# Patient Record
Sex: FEMALE | Race: WHITE | HISPANIC OR LATINO | Employment: UNEMPLOYED | URBAN - METROPOLITAN AREA
[De-identification: names, ages, dates, MRNs, and addresses within clinical notes are randomized per-mention and may not be internally consistent; named-entity substitution may affect disease eponyms.]

---

## 2017-03-21 ENCOUNTER — HOSPITAL ENCOUNTER (EMERGENCY)
Facility: HOSPITAL | Age: 37
Discharge: HOME | End: 2017-03-21
Attending: EMERGENCY MEDICINE
Payer: MEDICAID

## 2017-03-21 VITALS
SYSTOLIC BLOOD PRESSURE: 113 MMHG | WEIGHT: 226 LBS | OXYGEN SATURATION: 99 % | TEMPERATURE: 98 F | RESPIRATION RATE: 12 BRPM | HEIGHT: 62 IN | BODY MASS INDEX: 41.59 KG/M2 | DIASTOLIC BLOOD PRESSURE: 56 MMHG | HEART RATE: 63 BPM

## 2017-03-21 DIAGNOSIS — R52 PAIN: ICD-10-CM

## 2017-03-21 DIAGNOSIS — S63.502A WRIST SPRAIN, LEFT, INITIAL ENCOUNTER: Primary | ICD-10-CM

## 2017-03-21 LAB
B-HCG UR QL: NEGATIVE
CTP QC/QA: YES

## 2017-03-21 PROCEDURE — 25000003 PHARM REV CODE 250: Performed by: EMERGENCY MEDICINE

## 2017-03-21 PROCEDURE — 99284 EMERGENCY DEPT VISIT MOD MDM: CPT | Mod: 25

## 2017-03-21 PROCEDURE — 29125 APPL SHORT ARM SPLINT STATIC: CPT | Mod: LT

## 2017-03-21 RX ORDER — PROPRANOLOL HYDROCHLORIDE 10 MG/1
10 TABLET ORAL 3 TIMES DAILY
COMMUNITY
End: 2018-08-22

## 2017-03-21 RX ORDER — ACETAMINOPHEN 500 MG
1000 TABLET ORAL 3 TIMES DAILY PRN
Qty: 30 TABLET | Refills: 0
Start: 2017-03-21 | End: 2018-08-22

## 2017-03-21 RX ORDER — IBUPROFEN 200 MG
400 TABLET ORAL EVERY 6 HOURS PRN
Qty: 30 TABLET | Refills: 0
Start: 2017-03-21 | End: 2018-08-22

## 2017-03-21 RX ORDER — HYDROCODONE BITARTRATE AND ACETAMINOPHEN 10; 325 MG/1; MG/1
1 TABLET ORAL
Status: COMPLETED | OUTPATIENT
Start: 2017-03-21 | End: 2017-03-21

## 2017-03-21 RX ADMIN — HYDROCODONE BITARTRATE AND ACETAMINOPHEN 1 TABLET: 10; 325 TABLET ORAL at 07:03

## 2017-03-21 NOTE — ED AVS SNAPSHOT
[facility] [address]  [place] [address]               Migdalia Stephens   3/21/2017  7:03 PM   ED    Description:  Female : 1980   Department:  [facility]           Your Care was Coordinated By:     Provider Role From To    Massiel Stephen MD Attending Provider [phone] --      Reason for Visit     Wrist Pain           Diagnoses this Visit        Comments    Wrist sprain, left, initial encounter    -  Primary     Pain           ED Disposition     ED Disposition Condition Comment    Discharge             To Do List           Follow-up Information     Follow up with [facility] In 1 week(s).    Specialty:  Family Medicine    Contact information:    [address] [facility] [address]  [phone]         These Medications        Disp Refills Start End    ibuprofen (ADVIL,MOTRIN) 200 MG tablet 30 tablet 0 3/21/2017     Take 2 tablets (400 mg total) by mouth every 6 (six) hours as needed for Pain. - Oral    Pharmacy: [facility] - [place], [place] - [address] Ph #: [phone]       acetaminophen (TYLENOL) 500 MG tablet 30 tablet 0 3/21/2017     Take 2 tablets (1,000 mg total) by mouth 3 (three) times daily as needed for Pain. - Oral    Pharmacy: [facility] - [place], [place] - [address] Ph #: [phone]         [facility]     [facility] Care [facility] -  Assistance  Registered nurses in the [facility] provide clinical advisement, health education, appointment booking, and other advisory services.  Call for this free service at [phone].             Medications           Message regarding Medications     Verify the changes and/or additions to your medication regime listed below are the same as discussed with your clinician today.  If any of these changes or additions are incorrect, please  "notify your healthcare provider.        START taking these NEW medications        Refills    ibuprofen (ADVIL,MOTRIN) 200 MG tablet 0    Sig: Take 2 tablets (400 mg total) by mouth every 6 (six) hours as needed for Pain.    Class: No Print    Route: Oral    acetaminophen (TYLENOL) 500 MG tablet 0    Sig: Take 2 tablets (1,000 mg total) by mouth 3 (three) times daily as needed for Pain.    Class: No Print    Route: Oral      These medications were administered today        Dose Freq    hydrocodone-acetaminophen 10-325mg per tablet 1 tablet 1 tablet ED 1 Time    Sig: Take 1 tablet by mouth ED 1 Time.    Class: Normal    Route: Oral           Verify that the below list of medications is an accurate representation of the medications you are currently taking.  If none reported, the list may be blank. If incorrect, please contact your healthcare provider. Carry this list with you in case of emergency.           Current Medications     propranolol (INDERAL) 10 MG tablet Take 10 mg by mouth 3 (three) times daily.    acetaminophen (TYLENOL) 500 MG tablet Take 2 tablets (1,000 mg total) by mouth 3 (three) times daily as needed for Pain.    esomeprazole (NEXIUM) 40 MG capsule Take 1 capsule (40 mg total) by mouth once daily.    ibuprofen (ADVIL,MOTRIN) 200 MG tablet Take 2 tablets (400 mg total) by mouth every 6 (six) hours as needed for Pain.    ondansetron (ZOFRAN-ODT) 4 MG TbDL Take 2 tablets (8 mg total) by mouth every 8 (eight) hours as needed.           Clinical Reference Information           Your Vitals Were     BP Pulse Temp Resp Height Weight    113/56 (BP Location: Right arm, Patient Position: Sitting, BP Method: Automatic) 63 98 °F (36.7 °C) (Oral) 12 5' 2" (1.575 m) 102.5 kg (226 lb)    Last Period SpO2 BMI          03/14/2017 99% 41.34 kg/m2        Allergies as of 3/21/2017        Reactions    Imitrex [Sumatriptan] Shortness Of Breath    Chest tightness    Sumatriptan Succinate     Other reaction(s): chest " pain  Other reaction(s): Shortness of breath      Immunizations Administered on Date of Encounter - 3/21/2017     None      ED Micro, Lab, POCT     Start Ordered       Status Ordering Provider    03/21/17 0000 03/21/17 1915  POCT urine pregnancy     Comments:  This order was created through External Result Entry    Completed       ED Imaging Orders     Start Ordered       Status Ordering Provider    03/21/17 1909 03/21/17 1909  X-Ray Wrist Complete Left  1 time imaging      Final result         Discharge Instructions           Wrist Sprain  A sprain is an injury to the ligaments or capsule that holds a joint together. There are no broken bones. Most sprains take about 3 to 6 weeks to heal. If it a severe sprain where the ligament is completely torn, it can take months to recover.    Most wrist sprains are treated with a splint, wrist brace, or elastic wrap for support. Severe sprains may require surgery.  Home care  · Keep your arm elevated to reduce pain and swelling. This is very important during the first 48 hours.  · Apply an ice pack over the injured area for 15 to 20 minutes every 3 to 6 hours. You should do this for the first 24 to 48 hours. You can make an ice pack by filling a plastic bag that seals at the top with ice cubes and then wrapping it with a thin towel. Continue to use ice packs for relief of pain and swelling as needed. As the ice melts, be careful to avoid getting your wrap, splint, or cast wet. After 48 hours, apply heat (warm shower or warm bath) for 15 to 20 minutes several times a day, or alternate ice and heat.   · You may use over-the-counter pain medicine to control pain, unless another pain medicine was prescribed. If you have chronic liver or kidney disease or ever had a stomach ulcer or GI bleeding, talk with your doctor before using these medicines.  · If you were given a splint or brace, wear it for the time advised by your doctor.  Follow-up care  Follow up with your healthcare  provider as advised. Any X-rays you had today dont show any broken bones, breaks, or fractures. Sometimes fractures dont show up on the first X-ray. Bruises and sprains can sometimes hurt as much as a fracture. These injuries can take time to heal completely. If your symptoms dont improve or they get worse, talk with your doctor. You may need a repeat X-ray. If X-rays were taken, you will be told of any new findings that may affect your care.  When to seek medical advice  Call your healthcare provider right away if any of these occur:  · Pain or swelling increases  · Fingers or hand becomes cold, blue, numb, or tingly  Date Last Reviewed: 11/20/2015  © 8364-8124 [employer]. [address] [place] [address]. All rights reserved. This information is not intended as a substitute for professional medical care. Always follow your healthcare professional's instructions.          Wrist Splint: Velcro  A splint is designed to prevent movement of the bones, muscles and tendons. Velcro wrist splints are used because of their comfort and convenience for wrist and hand injuries. In certain conditions, the splint can be removed when bathing or changing clothes. The condition you are being treated for will determine how long you should wear the splint and if it is safe to remove your splint before your next visit. If you are unsure, ask your nurse or doctor.  When to seek medical advice  Call your healthcare provider right away if any of these occur:  · Increased pain or swelling under the splint or in the hand or fingers  · Fingers or hand becomes cold, blue, numb or tingly  Date Last Reviewed: 11/21/2015 © 2000-2016 [employer]. [address] [place] [address]. All rights reserved. This information is not intended as a substitute for professional medical care. Always follow your healthcare professional's instructions.           [facility] complies with  applicable Federal civil rights laws and does not discriminate on the basis of race, color, national origin, age, disability, or sex.        Language Assistance Services     ATTENTION: Language assistance services are available, free of charge. Please call [phone].      ATENCIÓN: Si habla prakash, tiene a palm disposición servicios gratuitos de asistencia lingüística. Llame al [phone].     CHÚ Ý: N?u b?n nói Ti?ng Vi?t, có các d?ch v? h? tr? ngôn ng? mi?n phí dành cho b?n. G?i s? [phone].

## 2017-03-22 NOTE — DISCHARGE INSTRUCTIONS
Wrist Sprain  A sprain is an injury to the ligaments or capsule that holds a joint together. There are no broken bones. Most sprains take about 3 to 6 weeks to heal. If it a severe sprain where the ligament is completely torn, it can take months to recover.    Most wrist sprains are treated with a splint, wrist brace, or elastic wrap for support. Severe sprains may require surgery.  Home care  · Keep your arm elevated to reduce pain and swelling. This is very important during the first 48 hours.  · Apply an ice pack over the injured area for 15 to 20 minutes every 3 to 6 hours. You should do this for the first 24 to 48 hours. You can make an ice pack by filling a plastic bag that seals at the top with ice cubes and then wrapping it with a thin towel. Continue to use ice packs for relief of pain and swelling as needed. As the ice melts, be careful to avoid getting your wrap, splint, or cast wet. After 48 hours, apply heat (warm shower or warm bath) for 15 to 20 minutes several times a day, or alternate ice and heat.   · You may use over-the-counter pain medicine to control pain, unless another pain medicine was prescribed. If you have chronic liver or kidney disease or ever had a stomach ulcer or GI bleeding, talk with your doctor before using these medicines.  · If you were given a splint or brace, wear it for the time advised by your doctor.  Follow-up care  Follow up with your healthcare provider as advised. Any X-rays you had today dont show any broken bones, breaks, or fractures. Sometimes fractures dont show up on the first X-ray. Bruises and sprains can sometimes hurt as much as a fracture. These injuries can take time to heal completely. If your symptoms dont improve or they get worse, talk with your doctor. You may need a repeat X-ray. If X-rays were taken, you will be told of any new findings that may affect your care.  When to seek medical advice  Call your healthcare provider right away if any of  these occur:  · Pain or swelling increases  · Fingers or hand becomes cold, blue, numb, or tingly  Date Last Reviewed: 11/20/2015  © 0544-2818 [employer]. [address] [place] [address]. All rights reserved. This information is not intended as a substitute for professional medical care. Always follow your healthcare professional's instructions.          Wrist Splint: Velcro  A splint is designed to prevent movement of the bones, muscles and tendons. Velcro wrist splints are used because of their comfort and convenience for wrist and hand injuries. In certain conditions, the splint can be removed when bathing or changing clothes. The condition you are being treated for will determine how long you should wear the splint and if it is safe to remove your splint before your next visit. If you are unsure, ask your nurse or doctor.  When to seek medical advice  Call your healthcare provider right away if any of these occur:  · Increased pain or swelling under the splint or in the hand or fingers  · Fingers or hand becomes cold, blue, numb or tingly  Date Last Reviewed: 11/21/2015 © 2000-2016 [employer]. [address] [place] [address]. All rights reserved. This information is not intended as a substitute for professional medical care. Always follow your healthcare professional's instructions.

## 2017-03-22 NOTE — ED PROVIDER NOTES
Encounter Date: 3/21/2017       History     Chief Complaint   Patient presents with    Wrist Pain     left wrist; states fell onto wrist yesterday; FROM; palpable pulse, less than 3 sec refill; no obvious deformity or swelling; took ibuprofen this morning with minimal relief     Review of patient's allergies indicates:   Allergen Reactions    Imitrex [sumatriptan] Shortness Of Breath     Chest tightness    Sumatriptan succinate      Other reaction(s): chest pain  Other reaction(s): Shortness of breath     HPI Comments: 36-year-old female presents with a pain to her left wrist after a fall yesterday.  She is able to move it but has pain with ulnar rotation supination and pronation.  She also has some pain over her just proximal to her thumb.  She has no shoulder or elbow pain.  She has no abrasions.  She has mild amount of swelling over her distal wrist.    Patient is a 36 y.o. female presenting with the following complaint: arm injury. The history is provided by the patient.   Arm Injury    The incident occurred yesterday. The injury mechanism was a fall. No protective equipment was used. She came to the ER via by private vehicle. There is an injury to the left wrist. She is left-handed. Pertinent negatives include no numbness.     Past Medical History:   Diagnosis Date    Acne     Acne     ADD (attention deficit disorder)     Allergy     Anxiety     Depression     GERD (gastroesophageal reflux disease)     Meningitis     at age 15     Past Surgical History:   Procedure Laterality Date    ABCESS DRAINAGE      of parotid gland due to mumps at age 7    CHOLECYSTECTOMY  2005    TONSILLECTOMY      TUBAL LIGATION  2003    TYMPANOSTOMY TUBE PLACEMENT       Family History   Problem Relation Age of Onset    Hyperlipidemia Mother     Diabetes Father     Hypertension Father     Heart disease Father 35    Asthma Daughter     Stroke Maternal Grandmother     Stroke Maternal Grandfather     Stroke Paternal  Grandmother     Stroke Paternal Grandfather     Diabetes Paternal Grandfather     Hypertension Paternal Grandfather     Melanoma Neg Hx      Social History   Substance Use Topics    Smoking status: Never Smoker    Smokeless tobacco: None    Alcohol use No     Review of Systems   Constitutional: Negative.    Eyes: Negative.    Respiratory: Negative.    Genitourinary: Negative.    Neurological: Negative for numbness.   All other systems reviewed and are negative.      Physical Exam   Initial Vitals   BP Pulse Resp Temp SpO2   03/21/17 1816 03/21/17 1816 03/21/17 1816 03/21/17 1816 03/21/17 1816   137/76 72 12 98 °F (36.7 °C) 99 %     Physical Exam    Nursing note and vitals reviewed.  Constitutional: She appears well-developed and well-nourished. She appears distressed.   HENT:   Head: Normocephalic and atraumatic.   Eyes: EOM are normal. Pupils are equal, round, and reactive to light.   Neck: Normal range of motion. Neck supple.   Cardiovascular: Normal rate and normal heart sounds.   Pulmonary/Chest: Breath sounds normal.   Abdominal: Soft.   Musculoskeletal: Normal range of motion.   Ulnar styloid tenderness, as well as mild tenderness over her scaphoid.  She has no bruising but she does have a mild amount of swelling there.  She has very minimal amount of tenderness of her distal radius.  She has 5/5 strength with wrist extension, wrist flexion finger abduction, and hand intrinsics   Neurological: She is alert and oriented to person, place, and time. She has normal strength. No cranial nerve deficit.   Skin: Skin is warm and dry.   Psychiatric: She has a normal mood and affect. Her behavior is normal. Thought content normal.         ED Course   Procedures  Labs Reviewed - No data to display          Medical Decision Making:   Initial Assessment:   36-year-old female status post fall on an outstretched hand 24 hours ago here with persistent pain in her left distal wrist over her ulnar styloid  Differential  Diagnosis:   Differential diagnoses most likely contusion versus nondisplaced ulnar styloid fracture, versus less likely though possible nondisplaced scaphoid fracture.  Clinical Tests:   Radiological Study: Ordered and Reviewed  ED Management:  X-ray without any evidence of a styloid fracture.  Given the patient does have some snuffbox tenderness will give a cockup splint.  Patient was recommended to wear the so long as her wrist was bothering her.  She should follow-up in 2 weeks for repeat x-ray.  Patient was instructed to use ice and ibuprofen.                   ED Course     Clinical Impression:   The primary encounter diagnosis was Wrist sprain, left, initial encounter. A diagnosis of Pain was also pertinent to this visit.          Massiel Stephen MD  03/21/17 2003       Massiel Stephen MD  03/21/17 2004

## 2018-01-15 ENCOUNTER — OFFICE VISIT (OUTPATIENT)
Dept: URGENT CARE | Facility: CLINIC | Age: 38
End: 2018-01-15
Payer: COMMERCIAL

## 2018-01-15 VITALS
SYSTOLIC BLOOD PRESSURE: 115 MMHG | TEMPERATURE: 98 F | DIASTOLIC BLOOD PRESSURE: 65 MMHG | RESPIRATION RATE: 18 BRPM | HEART RATE: 60 BPM | OXYGEN SATURATION: 98 %

## 2018-01-15 DIAGNOSIS — L60.0 INGROWN NAIL OF GREAT TOE OF LEFT FOOT: Primary | ICD-10-CM

## 2018-01-15 PROCEDURE — 11719 TRIM NAIL(S) ANY NUMBER: CPT | Mod: S$GLB,,, | Performed by: FAMILY MEDICINE

## 2018-01-15 PROCEDURE — 99213 OFFICE O/P EST LOW 20 MIN: CPT | Mod: 25,S$GLB,, | Performed by: FAMILY MEDICINE

## 2018-01-15 RX ORDER — CEPHALEXIN 500 MG/1
500 CAPSULE ORAL EVERY 8 HOURS
Qty: 30 CAPSULE | Refills: 0 | Status: SHIPPED | OUTPATIENT
Start: 2018-01-15 | End: 2018-01-25

## 2018-01-15 NOTE — PROGRESS NOTES
Subjective:       Patient ID: Migdalia Stephens is a 37 y.o. female.    Vitals:  tympanic temperature is 97.7 °F (36.5 °C). Her blood pressure is 115/65 and her pulse is 60. Her respiration is 18 and oxygen saturation is 98%.     Chief Complaint: Ingrown Toenail (left big toe)    Ingrown Toenail   This is a new problem. The current episode started in the past 7 days. The problem occurs constantly. The problem has been unchanged. Associated symptoms include joint swelling. Pertinent negatives include no abdominal pain, chest pain, chills, fever, headaches, nausea, rash, sore throat or vomiting. The symptoms are aggravated by standing and walking. She has tried nothing for the symptoms.     Review of Systems   Constitution: Negative for chills and fever.   HENT: Negative for sore throat.    Eyes: Negative for blurred vision.   Cardiovascular: Negative for chest pain.   Respiratory: Negative for shortness of breath.    Skin: Negative for rash.   Musculoskeletal: Positive for joint pain and joint swelling. Negative for back pain.   Gastrointestinal: Negative for abdominal pain, diarrhea, nausea and vomiting.   Neurological: Negative for headaches.   Psychiatric/Behavioral: The patient is not nervous/anxious.        Objective:      Physical Exam   Constitutional: She is oriented to person, place, and time. She appears well-developed and well-nourished. She is cooperative.  Non-toxic appearance. She does not appear ill. No distress.   HENT:   Head: Normocephalic and atraumatic.   Right Ear: Hearing, tympanic membrane, external ear and ear canal normal.   Left Ear: Hearing, tympanic membrane, external ear and ear canal normal.   Nose: Nose normal. No mucosal edema, rhinorrhea or nasal deformity. No epistaxis. Right sinus exhibits no maxillary sinus tenderness and no frontal sinus tenderness. Left sinus exhibits no maxillary sinus tenderness and no frontal sinus tenderness.   Mouth/Throat: Uvula is midline, oropharynx is  clear and moist and mucous membranes are normal. No trismus in the jaw. Normal dentition. No uvula swelling. No posterior oropharyngeal erythema.   Eyes: Conjunctivae and lids are normal. Right eye exhibits no discharge. Left eye exhibits no discharge. No scleral icterus.   Sclera clear bilat   Neck: Trachea normal, normal range of motion, full passive range of motion without pain and phonation normal. Neck supple.   Cardiovascular: Normal rate, regular rhythm, normal heart sounds, intact distal pulses and normal pulses.    Pulmonary/Chest: Effort normal and breath sounds normal. No respiratory distress.   Abdominal: Soft. Normal appearance and bowel sounds are normal. She exhibits no distension, no pulsatile midline mass and no mass. There is no tenderness.   Musculoskeletal: Normal range of motion. She exhibits no edema or tenderness.   Neurological: She is alert and oriented to person, place, and time. She exhibits normal muscle tone. Coordination normal.   Skin: Skin is warm, dry and intact. She is not diaphoretic. No pallor.   Psychiatric: She has a normal mood and affect. Her speech is normal and behavior is normal. Judgment and thought content normal. Cognition and memory are normal.   Nursing note and vitals reviewed.      Assessment:       1. Ingrown nail of great toe of left foot        Plan:         Ingrown nail of great toe of left foot  -     Foot Care    Other orders  -     cephALEXin (KEFLEX) 500 MG capsule; Take 1 capsule (500 mg total) by mouth every 8 (eight) hours.  Dispense: 30 capsule; Refill: 0

## 2018-01-15 NOTE — PROCEDURES
Foot Care  Date/Time: 1/15/2018 10:30 AM  Performed by: BAN CARCAMO  Authorized by: BAN CARCAMO     Hyperkeratotic Skin Lesions?: No      Nail Care Type:  Trim  Patient tolerance:  Patient tolerated the procedure well with no immediate complications     After procedure, dresssing placed no complication

## 2018-08-22 ENCOUNTER — OFFICE VISIT (OUTPATIENT)
Dept: PSYCHIATRY | Facility: CLINIC | Age: 38
End: 2018-08-22
Payer: COMMERCIAL

## 2018-08-22 VITALS
WEIGHT: 161.25 LBS | DIASTOLIC BLOOD PRESSURE: 70 MMHG | BODY MASS INDEX: 29.67 KG/M2 | HEIGHT: 62 IN | HEART RATE: 82 BPM | SYSTOLIC BLOOD PRESSURE: 112 MMHG

## 2018-08-22 DIAGNOSIS — F98.8 ATTENTION DEFICIT DISORDER OF ADULT: Primary | ICD-10-CM

## 2018-08-22 PROCEDURE — 3008F BODY MASS INDEX DOCD: CPT | Mod: CPTII,S$GLB,, | Performed by: PSYCHIATRY & NEUROLOGY

## 2018-08-22 PROCEDURE — 99999 PR PBB SHADOW E&M-EST. PATIENT-LVL III: CPT | Mod: PBBFAC,,, | Performed by: PSYCHIATRY & NEUROLOGY

## 2018-08-22 PROCEDURE — 99214 OFFICE O/P EST MOD 30 MIN: CPT | Mod: S$GLB,,, | Performed by: PSYCHIATRY & NEUROLOGY

## 2018-08-22 RX ORDER — LISDEXAMFETAMINE DIMESYLATE 40 MG/1
40 CAPSULE ORAL DAILY
Qty: 60 CAPSULE | Refills: 0 | Status: SHIPPED | OUTPATIENT
Start: 2018-10-21 | End: 2023-09-18

## 2018-08-22 RX ORDER — LISDEXAMFETAMINE DIMESYLATE 30 MG/1
30 CAPSULE ORAL EVERY MORNING
Qty: 30 CAPSULE | Refills: 0 | Status: SHIPPED | OUTPATIENT
Start: 2018-08-22 | End: 2023-09-18

## 2018-08-22 RX ORDER — LISDEXAMFETAMINE DIMESYLATE 40 MG/1
40 CAPSULE ORAL DAILY
Qty: 30 CAPSULE | Refills: 0 | Status: SHIPPED | OUTPATIENT
Start: 2018-09-21 | End: 2023-09-18

## 2018-08-22 NOTE — PATIENT INSTRUCTIONS
"        You have been provided with a certain amount of medication with a specified number of refills.  Please follow up within an adequate time before you run out of medications.    REFILLS FOR CONTROLLED SUBSTANCES WILL NOT BE GIVEN WITHOUT AN APPOINTMENT.  I will not honor or fill automated refill requests from pharmacies.  You must come in for an appointment to get refills.        Please book your next appointment for myself or therapist by phone by calling our office at [phone].          PLEASE BE AT LEAST 15 MINUTES EARLY FOR YOUR NEXT APPOINTMENT.  PLEASE, DO NOT BE LATE OR YOU WILL BE TURNED AWAY AND ASKED TO RESCHEDULE.  YOU MUST COME EARLY TO ALLOW TIME FOR CHECK-IN AS WELL AS GET YOUR VITAL SIGNS AND GO OVER YOUR MEDICATIONS.  [employer] is not fair to the patients who present after you and are on time for their appointments.  It causes a delay in the appointments for patients and staff.  IF YOU ARE LATE, THERE IS A POSSIBILITY THAT YOU WILL BE CHARGED FOR THE APPOINTMENT TIME PERSONALLY AND IT WILL NOT GO TO YOUR INSURANCE.  YOU MAY ALSO BE DISCHARGED FROM CLINIC with multiple "No Show" appointments.       -----------------------------------------------------------------------------------------------------------------  IF YOU FEEL SUICIDAL OR HAVING THOUGHTS OR PLANS TO HURT YOURSELF OR OTHERS, CALL 911 OR REPORT TO THE NEAREST EMERGENCY ROOM.  YOU CAN ALSO ACCESS THE FOLLOWING HOTLINE:    National Suicide Hotline Number 9-226-777-TALK (6540)                 "

## 2018-08-22 NOTE — PROGRESS NOTES
Outpatient Psychiatry Follow-Up Visit (MD/NP)    8/22/2018    Clinical Status of Patient:  Outpatient (Ambulatory)    Chief Complaint:  Migdalia Crouch is a 37 y.o. female who presents today for follow-up of anxiety and attention problems.  Met with patient.      Interval History and Content of Current Session:  Interim Events/Subjective Report/Content of Current Session: Patient Migdalia Crouch presents to clinic after a long hiatus.  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  She is about to take a 4 month course in order to be promoted.  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  She used to see me in the past for trouble with focus and concentration and did well with Vyvanse.  She is looking to get back on this medication during the course of her studies.  Since last seen her she has done quite well in life and has gone down from 241 to 161 lb with diet and exercise.  She continues to diet and exercise.  No depression or anxiety.  She is having significant problems with focus and concentration.  She is not able to complete tasks or sit down long enough to study for tests.    Psychotherapy:  · Target symptoms: distractability, lack of focus, anxiety   · Why chosen therapy is appropriate versus another modality: patient responds to this modality  · Outcome monitoring methods: self-report, observation  · Therapeutic intervention type: supportive psychotherapy  · Topics discussed/themes: building skills sets for symptom management, symptom recognition  · The patient's response to the intervention is accepting. The patient's progress toward treatment goals is fair.   · Duration of intervention: 15 minutes    Review of Systems   · PSYCHIATRIC: Pertinant items are noted in the narrative.  · CONSTITUTIONAL: No weight gain or loss.   · MUSCULOSKELETAL: No pain or stiffness of the joints.  · NEUROLOGIC: No weakness, sensory changes, seizures,  "confusion, memory loss, tremor or other abnormal movements.  · RESPIRATORY: No shortness of breath.  · CARDIOVASCULAR: No tachycardia or chest pain.  · GASTROINTESTINAL: No nausea, vomiting, pain, constipation or diarrhea.    Past Medical, Family and Social History: The patient's past medical, family and social history have been reviewed and updated as appropriate within the electronic medical record - see encounter notes.    Compliance: yes    Side effects: None    Risk Parameters:  Patient reports no suicidal ideation  Patient reports no homicidal ideation  Patient reports no self-injurious behavior  Patient reports no violent behavior    Exam (detailed: at least 9 elements; comprehensive: all 15 elements)   Constitutional  Vitals:  Most recent vital signs, dated less than 90 days prior to this appointment, were reviewed.   Vitals:    08/22/18 0744   BP: 112/70   Pulse: 82   Weight: 73.1 kg (161 lb 4.3 oz)   Height: 5' 2" (1.575 m)        General:  unremarkable, age appropriate     Musculoskeletal  Muscle Strength/Tone:  no tremor, no tic   Gait & Station:  non-ataxic     Psychiatric  Speech:  no latency; no press   Mood & Affect:  euthymic  congruent and appropriate   Thought Process:  normal and logical   Associations:  intact   Thought Content:  normal, no suicidality, no homicidality, delusions, or paranoia   Insight:  has awareness of illness   Judgement: behavior is adequate to circumstances   Orientation:  person, place, situation, time/date   Memory: intact for content of interview   Language: grossly intact   Attention Span & Concentration:  distracted   Fund of Knowledge:  intact and appropriate to age and level of education     Assessment and Diagnosis   Status/Progress: Based on the examination today, the patient's problem(s) is/are adequately but not ideally controlled.  New problems have been presented today.   Co-morbidities are not complicating management of the primary condition.  There are no " active rule-out diagnoses for this patient at this time.     General Impression: We will continue pharmacological intervention and adjunctive therapy.       ICD-10-CM ICD-9-CM   1. Attention deficit disorder of adult F98.8 314.00       Intervention/Counseling/Treatment Plan   · Medication Management: Continue current medications. The risks and benefits of medication were discussed with the patient.  · Counseling provided with patient as follows: importance of compliance with chosen treatment options was emphasized, risks and benefits of treatment options, including medications, were discussed with the patient, risk factor reduction, prognosis, patient education, instructions for  management, treatment and follow-up were reviewed  1.  Restart Vyvanse 30mg PO daily for 1 month then increase to 40 mg daily thereafter targeting focus and attention.  Warned of risk of addictive potential, HTN, anxiety, insomnia.    Return to Clinic: 3 months, as needed

## 2018-10-24 ENCOUNTER — PATIENT MESSAGE (OUTPATIENT)
Dept: PSYCHIATRY | Facility: CLINIC | Age: 38
End: 2018-10-24

## 2019-10-04 ENCOUNTER — HOSPITAL ENCOUNTER (EMERGENCY)
Facility: HOSPITAL | Age: 39
Discharge: HOME | End: 2019-10-04
Attending: EMERGENCY MEDICINE

## 2019-10-04 VITALS
OXYGEN SATURATION: 97 % | BODY MASS INDEX: 30.73 KG/M2 | WEIGHT: 167 LBS | DIASTOLIC BLOOD PRESSURE: 66 MMHG | HEART RATE: 62 BPM | RESPIRATION RATE: 17 BRPM | SYSTOLIC BLOOD PRESSURE: 109 MMHG | HEIGHT: 62 IN | TEMPERATURE: 98 F

## 2019-10-04 DIAGNOSIS — R10.13 EPIGASTRIC ABDOMINAL PAIN: Primary | ICD-10-CM

## 2019-10-04 LAB
B-HCG UR QL: NEGATIVE
CTP QC/QA: YES

## 2019-10-04 PROCEDURE — 81025 URINE PREGNANCY TEST: CPT | Performed by: EMERGENCY MEDICINE

## 2019-10-04 PROCEDURE — 99283 EMERGENCY DEPT VISIT LOW MDM: CPT

## 2019-10-04 PROCEDURE — 25000003 PHARM REV CODE 250: Performed by: EMERGENCY MEDICINE

## 2019-10-04 RX ORDER — ONDANSETRON 4 MG/1
4 TABLET, ORALLY DISINTEGRATING ORAL
Status: COMPLETED | OUTPATIENT
Start: 2019-10-04 | End: 2019-10-04

## 2019-10-04 RX ADMIN — ONDANSETRON 4 MG: 4 TABLET, ORALLY DISINTEGRATING ORAL at 02:10

## 2019-10-04 RX ADMIN — LIDOCAINE HYDROCHLORIDE: 20 SOLUTION ORAL; TOPICAL at 02:10

## 2019-10-04 NOTE — ED PROVIDER NOTES
"Encounter Date: 10/4/2019    SCRIBE #1 NOTE: I, Michelle Ahumada, am scribing for, and in the presence of,  Dr. Chatterjee. I have scribed the entire note.       History     Chief Complaint   Patient presents with    Abdominal Pain     Pt reports she is having intermittent pain to abdomen right above her navel, started about 1 hour ago. Pt reports that the last time she came here for this, they gave her some "creamy white stuff" to drinka nd the pain went away. +Nausea when the pain hits but denies vomiting.      Time seen by provider: 2:20 PM    This is a 38 y.o. female with a history of GERD who presents to the ED with complaint of intermittent mid epigastric abdominal pain that started at noon today. Patient says she ate lunch and took an "old Tylenol something that was prescribed" for body aches. She says she felt like she didn't eat enough lunch because then she started to have a burning sensation in her abdomen. She notes the pain only lasts a few minutes. Patient describes the pain to be similar to the pain she had before she had a cholecystectomy. Patient reports associated nausea, weakness, and diaphoresis whenever the pain flares up. She denies fever, vomiting, diarrhea, back pain, or any other complaints at this time. Patient says she came to the ED before for similar symptoms. She was given a "creamy white drink" that she reports helped relieve her pain.    The history is provided by the patient.     Review of patient's allergies indicates:   Allergen Reactions    Imitrex [sumatriptan] Shortness Of Breath     Chest tightness    Sumatriptan succinate      Other reaction(s): chest pain  Other reaction(s): Shortness of breath     Past Medical History:   Diagnosis Date    Acne     Acne     ADD (attention deficit disorder)     Allergy     Anxiety     Depression     GERD (gastroesophageal reflux disease)     Meningitis     at age 15     Past Surgical History:   Procedure Laterality Date    ABCESS " DRAINAGE      of parotid gland due to mumps at age 7    CHOLECYSTECTOMY  2005    TONSILLECTOMY      TUBAL LIGATION  2003    TYMPANOSTOMY TUBE PLACEMENT       Family History   Problem Relation Age of Onset    Hyperlipidemia Mother     Diabetes Father     Hypertension Father     Heart disease Father 35    Asthma Daughter     Stroke Maternal Grandmother     Stroke Maternal Grandfather     Stroke Paternal Grandmother     Stroke Paternal Grandfather     Diabetes Paternal Grandfather     Hypertension Paternal Grandfather     Melanoma Neg Hx      Social History     Tobacco Use    Smoking status: Never Smoker    Smokeless tobacco: Never Used   Substance Use Topics    Alcohol use: No    Drug use: No     Review of Systems   Constitutional: Positive for diaphoresis.   Gastrointestinal: Positive for abdominal pain and nausea.   Neurological: Positive for weakness.   All other systems reviewed and are negative.      Physical Exam     Initial Vitals [10/04/19 1309]   BP Pulse Resp Temp SpO2   122/64 65 20 98 °F (36.7 °C) 99 %      MAP       --         Physical Exam    Nursing note and vitals reviewed.  Constitutional: She appears well-developed and well-nourished. No distress.   HENT:   Head: Normocephalic and atraumatic.   Mouth/Throat: Oropharynx is clear and moist.   Eyes: Conjunctivae and EOM are normal. Pupils are equal, round, and reactive to light.   Neck: Normal range of motion. Neck supple. No stridor present. No tracheal deviation present.   Cardiovascular: Normal rate, regular rhythm and normal heart sounds.   No murmur heard.  Pulmonary/Chest: Breath sounds normal. No respiratory distress. She has no wheezes.   Abdominal: Soft. Bowel sounds are normal. There is tenderness. There is no rebound and no guarding.   Mid epigastric tenderness   Musculoskeletal: Normal range of motion. She exhibits no edema or tenderness.   Neurological: She is alert and oriented to person, place, and time. No sensory  deficit.   Skin: Skin is warm and dry. Capillary refill takes less than 2 seconds.   Psychiatric: She has a normal mood and affect. Her behavior is normal.         ED Course   Procedures  Labs Reviewed   POCT URINE PREGNANCY             Medical Decision Making:   Clinical Tests:   Lab Tests: Ordered and Reviewed  ED Management:  38-year-old female who says she took a pain medicine on an empty stomach and now has epigastric burning rising up into her chest.  She was given a Zofran and then a GI cocktail which totally relieved her symptoms. This leads me to believe that she most likely has gastritis or peptic ulcer.  She will follow up with the primary physician when able for recheck and has been advised to take Maalox for epigastric discomfort.  Patient may also return here for any worsening of her condition.                      Clinical Impression:     1. Epigastric abdominal pain        Disposition:   Disposition: Discharged  Condition: Stable       I, Dr. Hosea Chatterjee, personally performed the services described in this documentation. All medical record entries made by the scribe were at my direction and in my presence. I have reviewed the chart and agree that the record reflects my personal performance and is accurate and complete. Hosea Chatterjee MD.  3:49 PM 10/04/2019                   Hosea Chatterjee MD  10/04/19 0957

## 2019-10-04 NOTE — ED TRIAGE NOTES
39 Y/O F with CC of abdominal pain. Pt reports intermittent burning pain to epigastric region with nausea. Reports hx of Cholecystectomy. States last time she had these symptoms she was given a GI cocktail which had resolved the symptoms.

## 2020-02-23 ENCOUNTER — OFFICE VISIT (OUTPATIENT)
Dept: URGENT CARE | Facility: CLINIC | Age: 40
End: 2020-02-23

## 2020-02-23 VITALS
HEIGHT: 62 IN | OXYGEN SATURATION: 100 % | DIASTOLIC BLOOD PRESSURE: 75 MMHG | BODY MASS INDEX: 30.73 KG/M2 | WEIGHT: 167 LBS | SYSTOLIC BLOOD PRESSURE: 121 MMHG | RESPIRATION RATE: 14 BRPM | TEMPERATURE: 99 F | HEART RATE: 76 BPM

## 2020-02-23 DIAGNOSIS — M54.50 ACUTE BILATERAL LOW BACK PAIN WITHOUT SCIATICA: Primary | ICD-10-CM

## 2020-02-23 PROCEDURE — 99203 PR OFFICE/OUTPT VISIT, NEW, LEVL III, 30-44 MIN: ICD-10-PCS | Mod: TIER,25,S$GLB, | Performed by: NURSE PRACTITIONER

## 2020-02-23 PROCEDURE — 96372 PR INJECTION,THERAP/PROPH/DIAG2ST, IM OR SUBCUT: ICD-10-PCS | Mod: TIER,S$GLB,, | Performed by: NURSE PRACTITIONER

## 2020-02-23 PROCEDURE — 99203 OFFICE O/P NEW LOW 30 MIN: CPT | Mod: TIER,25,S$GLB, | Performed by: NURSE PRACTITIONER

## 2020-02-23 PROCEDURE — 96372 THER/PROPH/DIAG INJ SC/IM: CPT | Mod: TIER,S$GLB,, | Performed by: NURSE PRACTITIONER

## 2020-02-23 RX ORDER — BETAMETHASONE SODIUM PHOSPHATE AND BETAMETHASONE ACETATE 3; 3 MG/ML; MG/ML
6 INJECTION, SUSPENSION INTRA-ARTICULAR; INTRALESIONAL; INTRAMUSCULAR; SOFT TISSUE
Status: COMPLETED | OUTPATIENT
Start: 2020-02-23 | End: 2020-02-23

## 2020-02-23 RX ADMIN — BETAMETHASONE SODIUM PHOSPHATE AND BETAMETHASONE ACETATE 6 MG: 3; 3 INJECTION, SUSPENSION INTRA-ARTICULAR; INTRALESIONAL; INTRAMUSCULAR; SOFT TISSUE at 11:02

## 2020-02-23 NOTE — PROGRESS NOTES
"Subjective:       Patient ID: Migdalia Crouch is a 39 y.o. female.    Vitals:  height is 5' 2" (1.575 m) and weight is 75.8 kg (167 lb). Her oral temperature is 99 °F (37.2 °C). Her blood pressure is 121/75 and her pulse is 76. Her respiration is 14 and oxygen saturation is 100%.     Chief Complaint: Back Pain    Pt reports bilateral low back pain x 2-3 days. Worsening. Does not radiate down her leg. Tried ibuprofen, flexeril, and oxycodone with minimal relief. No known injury or trauma. Denies loss of bowel or bladder control, paresthesias.     Back Pain   This is a new problem. Episode onset:  days. The problem occurs intermittently. The problem has been gradually worsening since onset. The pain is present in the sacro-iliac and lumbar spine. The quality of the pain is described as stabbing. The pain is at a severity of 8/10. The pain is moderate. The symptoms are aggravated by standing, position and bending. Pertinent negatives include no abdominal pain, bladder incontinence, bowel incontinence, dysuria or numbness. She has tried NSAIDs and muscle relaxant (Oxycodone) for the symptoms. The treatment provided no relief.       Constitution: Negative for fatigue.   Gastrointestinal: Negative for abdominal pain and bowel incontinence.   Genitourinary: Negative for dysuria, urgency, bladder incontinence and hematuria.   Musculoskeletal: Positive for back pain. Negative for muscle cramps and history of spine disorder.   Skin: Negative for rash.   Neurological: Negative for coordination disturbances, numbness and tingling.       Objective:      Physical Exam   Constitutional: She is oriented to person, place, and time. Vital signs are normal. She appears well-developed and well-nourished. She is active and cooperative. No distress.   HENT:   Head: Normocephalic and atraumatic.   Nose: Nose normal.   Mouth/Throat: Oropharynx is clear and moist and mucous membranes are normal.   Eyes: Conjunctivae and lids are normal. "   Neck: Trachea normal, normal range of motion, full passive range of motion without pain and phonation normal. Neck supple.   Cardiovascular: Normal rate, regular rhythm, normal heart sounds, intact distal pulses and normal pulses.   Pulmonary/Chest: Effort normal and breath sounds normal.   Abdominal: Soft. Normal appearance and bowel sounds are normal. She exhibits no abdominal bruit, no pulsatile midline mass and no mass.   Musculoskeletal: She exhibits no edema or deformity.        Lumbar back: She exhibits tenderness, pain and spasm. She exhibits normal range of motion and no bony tenderness.        Back:    Neurological: She is alert and oriented to person, place, and time. She has normal strength and normal reflexes. No sensory deficit.   Skin: Skin is warm, dry, intact and not diaphoretic.   Psychiatric: She has a normal mood and affect. Her speech is normal and behavior is normal. Judgment and thought content normal. Cognition and memory are normal.   Nursing note and vitals reviewed.        Assessment:       1. Acute bilateral low back pain without sciatica        Plan:         Acute bilateral low back pain without sciatica  -     betamethasone acetate-betamethasone sodium phosphate injection 6 mg         Reviewed previous pertinent office visits, PMH, PSH, fam hx  Continue with ibuprofen scheduled for 7 days. Flexeril as needed. She already has these meds so I have not sent any in for her. Gave her dosing instructions.  Steroid injection here  Advised on return/follow-up precautions. Advised on ER precautions. Answered all patient questions. Patient verbalized understanding and voiced agreement with current treatment plan.    Patient Instructions     Ibuprofen 800mg- take 1 tab every 8 hrs for 7 days  Cyclobenzaprine- take one tab as needed at night for muscles spasms      Lumbago: Cómo cuidarse    La mayoría de la gente tiene, de vez en cuando, dolor en la parte inferior de la espalda. En muchos casos  no se trata de un problema marko y el cuidado personal puede aliviarlo. Curry, algunas veces el lumbago (dolor lumbar) puede ser katia señal de un problema más marko. Llame al médico si palm dolor resurge a menudo o si empeora con el tiempo. Para el cuidado a maribel plazo de palm espalda, ness ejercicio con regularidad, pierda el exceso de peso y aprenda a mantener katia buena postura.  [place] un descanso corto  Si el dolor es muy corinne y aumenta al estar sentado o de pie, puede resultarle beneficioso recostarse yinka el día por algunos momentos. Estar mucho tiempo en la cama puede ser perjudicial.  Reduzca el dolor y la hinchazón  El frío reduce la hinchazón, y tanto el frío parker el calor pueden reducir el dolor. Protéjase la piel colocando katia toalla entre el cuerpo y el elemento frío o caliente.  · En los primeros días, aplique katia bolsa de hielo por entre 15 y 20 minutos.  · Después de los primeros días, pruebe a aplicar calor yinka 15 minutos cada vez para aliviar el dolor. Nunca duerma con katia almohadilla térmica.  · Los medicamentos de venta lynette pueden ayudarle a controlar el dolor y la inflamación. Pruebe a shahnaz aspirina o ibuprofeno.  Ejercicio  La actividad física puede ayudar a mejorar palm espalda, así parker a fortalecerla y darle mayor flexibilidad para prevenir nuevas lesiones. Pida a palm médico que le aconseje ejercicios específicos para la espalda.  Mantenga katia buena postura para prevenir futuras lesiones  · Al moverse, doble las caderas y las rodillas. No doble la cintura ni gire el cuerpo hacia los lados desde la cintura.  · Al levantar un objeto, manténgalo cerca del cuerpo. No intente levantar más peso del que puede manejar.  · Al sentarse, mantenga palm freda lumbar oleksandr apoyada. Utilice katia toalla enrollada si lo necesita.  Llame al médico si tiene alguno de estos síntomas:  · No puede ponerse de pie o caminar.  · Tiene katia fiebre superior a 101.0°F (38.3°C)  · Orina frecuentemente o con dolor, o tiene  avery en la orina.  · Tiene un dolor abdominal muy intenso.  · Siente un dolor asmita y punzante.  · El dolor es estrada.  · Siente dolor o insensibilidad en la pierna.  · Siente dolor en otra freda de la espalda.  · Nota que el dolor no disminuye al cabo de katia semana.   Date Last Reviewed: 9/29/2015  © 2736-3757 [employer], [employer]. [address] [place] [id number]. Todos los derechos reservados. Esta información no pretende sustituir la atención médica profesional. Sólo palm médico puede diagnosticar y tratar un problema de martínez.

## 2020-02-23 NOTE — PATIENT INSTRUCTIONS
Ibuprofen 800mg- take 1 tab every 8 hrs for 7 days  Cyclobenzaprine- take one tab as needed at night for muscles spasms      Lumbago: Cómo cuidarse    La mayoría de la gente tiene, de vez en cuando, dolor en la parte inferior de la espalda. En muchos casos no se trata de un problema marko y el cuidado personal puede aliviarlo. [place], algunas veces el lumbago (dolor lumbar) puede ser katia señal de un problema más marko. Llame al médico si palm dolor resurge a menudo o si empeora con el tiempo. Para el cuidado a maribel plazo de palm espalda, ness ejercicio con regularidad, pierda el exceso de peso y aprenda a mantener katia buena postura.  [place] un descanso corto  Si el dolor es muy corinne y aumenta al estar sentado o de pie, puede resultarle beneficioso recostarse yinka el día por algunos momentos. Estar mucho tiempo en la cama puede ser perjudicial.  Reduzca el dolor y la hinchazón  El frío reduce la hinchazón, y tanto el frío parker el calor pueden reducir el dolor. Protéjase la piel colocando katia toalla entre el cuerpo y el elemento frío o caliente.  · En los primeros días, aplique katia bolsa de hielo por entre 15 y 20 minutos.  · Después de los primeros días, pruebe a aplicar calor yinka 15 minutos cada vez para aliviar el dolor. Nunca duerma con katia almohadilla térmica.  · Los medicamentos de venta lynette pueden ayudarle a controlar el dolor y la inflamación. Pruebe a shahnaz aspirina o ibuprofeno.  Ejercicio  La actividad física puede ayudar a mejorar palm espalda, así parker a fortalecerla y darle mayor flexibilidad para prevenir nuevas lesiones. Pida a palm médico que le aconseje ejercicios específicos para la espalda.  Mantenga katia buena postura para prevenir futuras lesiones  · Al moverse, doble las caderas y las rodillas. No doble la cintura ni gire el cuerpo hacia los lados desde la cintura.  · Al levantar un objeto, manténgalo cerca del cuerpo. No intente levantar más peso del que puede manejar.  · Al sentarse, mantenga  palm freda lumbar oleksandr apoyada. Utilice katia toalla enrollada si lo necesita.  Llame al médico si tiene alguno de estos síntomas:  · No puede ponerse de pie o caminar.  · Tiene katia fiebre superior a 101.0°F (38.3°C)  · Orina frecuentemente o con dolor, o tiene avery en la orina.  · Tiene un dolor abdominal muy intenso.  · Siente un dolor asmita y punzante.  · El dolor es estrada.  · Siente dolor o insensibilidad en la pierna.  · Siente dolor en otra freda de la espalda.  · Nota que el dolor no disminuye al cabo de katia semana.   Date Last Reviewed: 9/29/2015  © 0876-2084 [employer], [employer]. [address] [place] [address], [place] [address]. Todos los derechos reservados. Esta información no pretende sustituir la atención médica profesional. Sólo palm médico puede diagnosticar y tratar un problema de martínez.

## 2020-10-16 LAB
GAMMA INTERFERON BACKGROUND BLD IA-ACNC: 0.05 IU/ML
HBV SURFACE AB SER-ACNC: 3.1 MIU/ML
M TB IFN-G BLD-IMP: NEGATIVE
M TB IFN-G CD4+ BCKGRND COR BLD-ACNC: 0.06 IU/ML
MEV IGG SER IA-ACNC: 59.4 AU/ML
MITOGEN IGNF BLD-ACNC: >10 IU/ML
MUV IGG SER IA-ACNC: 182 AU/ML
QUANTIFERON TB GOLD (INCUBATED): NORMAL
QUANTIFERON TB2 AG VALUE: 0.05 IU/ML
RUBV IGG SERPL IA-ACNC: 3.55 INDEX
SERVICE CMNT-IMP: NORMAL
VZV IGG SER IA-ACNC: 2653 INDEX

## 2021-04-15 ENCOUNTER — PATIENT MESSAGE (OUTPATIENT)
Dept: RESEARCH | Facility: HOSPITAL | Age: 41
End: 2021-04-15

## 2022-01-06 ENCOUNTER — PATIENT MESSAGE (OUTPATIENT)
Dept: ADMINISTRATIVE | Facility: OTHER | Age: 42
End: 2022-01-06

## 2022-01-06 ENCOUNTER — LAB VISIT (OUTPATIENT)
Dept: PRIMARY CARE CLINIC | Facility: CLINIC | Age: 42
End: 2022-01-06
Payer: OTHER GOVERNMENT

## 2022-01-06 DIAGNOSIS — Z20.822 CONTACT WITH AND (SUSPECTED) EXPOSURE TO COVID-19: ICD-10-CM

## 2022-01-06 LAB
CTP QC/QA: YES
SARS-COV-2 AG RESP QL IA.RAPID: NEGATIVE

## 2022-01-06 PROCEDURE — 87811 SARS-COV-2 COVID19 W/OPTIC: CPT

## 2022-05-07 ENCOUNTER — OFFICE VISIT (OUTPATIENT)
Dept: URGENT CARE | Facility: CLINIC | Age: 42
End: 2022-05-07
Payer: COMMERCIAL

## 2022-05-07 VITALS
HEART RATE: 78 BPM | TEMPERATURE: 98 F | SYSTOLIC BLOOD PRESSURE: 129 MMHG | DIASTOLIC BLOOD PRESSURE: 79 MMHG | RESPIRATION RATE: 16 BRPM | BODY MASS INDEX: 30.73 KG/M2 | HEIGHT: 62 IN | WEIGHT: 167 LBS | OXYGEN SATURATION: 98 %

## 2022-05-07 DIAGNOSIS — S16.1XXA STRAIN OF NECK MUSCLE, INITIAL ENCOUNTER: Primary | ICD-10-CM

## 2022-05-07 PROCEDURE — 96372 PR INJECTION,THERAP/PROPH/DIAG2ST, IM OR SUBCUT: ICD-10-PCS | Mod: S$GLB,,, | Performed by: FAMILY MEDICINE

## 2022-05-07 PROCEDURE — 72040 XR CERVICAL SPINE 2 OR 3 VIEWS: ICD-10-PCS | Mod: FY,S$GLB,, | Performed by: RADIOLOGY

## 2022-05-07 PROCEDURE — 1159F MED LIST DOCD IN RCRD: CPT | Mod: CPTII,S$GLB,, | Performed by: FAMILY MEDICINE

## 2022-05-07 PROCEDURE — 3078F DIAST BP <80 MM HG: CPT | Mod: CPTII,S$GLB,, | Performed by: FAMILY MEDICINE

## 2022-05-07 PROCEDURE — 1160F RVW MEDS BY RX/DR IN RCRD: CPT | Mod: CPTII,S$GLB,, | Performed by: FAMILY MEDICINE

## 2022-05-07 PROCEDURE — 72040 X-RAY EXAM NECK SPINE 2-3 VW: CPT | Mod: FY,S$GLB,, | Performed by: RADIOLOGY

## 2022-05-07 PROCEDURE — 3074F SYST BP LT 130 MM HG: CPT | Mod: CPTII,S$GLB,, | Performed by: FAMILY MEDICINE

## 2022-05-07 PROCEDURE — 3008F PR BODY MASS INDEX (BMI) DOCUMENTED: ICD-10-PCS | Mod: CPTII,S$GLB,, | Performed by: FAMILY MEDICINE

## 2022-05-07 PROCEDURE — 1160F PR REVIEW ALL MEDS BY PRESCRIBER/CLIN PHARMACIST DOCUMENTED: ICD-10-PCS | Mod: CPTII,S$GLB,, | Performed by: FAMILY MEDICINE

## 2022-05-07 PROCEDURE — 99214 PR OFFICE/OUTPT VISIT, EST, LEVL IV, 30-39 MIN: ICD-10-PCS | Mod: 25,S$GLB,, | Performed by: FAMILY MEDICINE

## 2022-05-07 PROCEDURE — 1159F PR MEDICATION LIST DOCUMENTED IN MEDICAL RECORD: ICD-10-PCS | Mod: CPTII,S$GLB,, | Performed by: FAMILY MEDICINE

## 2022-05-07 PROCEDURE — 99214 OFFICE O/P EST MOD 30 MIN: CPT | Mod: 25,S$GLB,, | Performed by: FAMILY MEDICINE

## 2022-05-07 PROCEDURE — 3078F PR MOST RECENT DIASTOLIC BLOOD PRESSURE < 80 MM HG: ICD-10-PCS | Mod: CPTII,S$GLB,, | Performed by: FAMILY MEDICINE

## 2022-05-07 PROCEDURE — 3008F BODY MASS INDEX DOCD: CPT | Mod: CPTII,S$GLB,, | Performed by: FAMILY MEDICINE

## 2022-05-07 PROCEDURE — 96372 THER/PROPH/DIAG INJ SC/IM: CPT | Mod: S$GLB,,, | Performed by: FAMILY MEDICINE

## 2022-05-07 PROCEDURE — 3074F PR MOST RECENT SYSTOLIC BLOOD PRESSURE < 130 MM HG: ICD-10-PCS | Mod: CPTII,S$GLB,, | Performed by: FAMILY MEDICINE

## 2022-05-07 RX ORDER — KETOROLAC TROMETHAMINE 30 MG/ML
30 INJECTION, SOLUTION INTRAMUSCULAR; INTRAVENOUS
Status: COMPLETED | OUTPATIENT
Start: 2022-05-07 | End: 2022-05-07

## 2022-05-07 RX ORDER — CYCLOBENZAPRINE HCL 10 MG
TABLET ORAL
Qty: 30 TABLET | Refills: 0 | Status: SHIPPED | OUTPATIENT
Start: 2022-05-07

## 2022-05-07 RX ADMIN — KETOROLAC TROMETHAMINE 30 MG: 30 INJECTION, SOLUTION INTRAMUSCULAR; INTRAVENOUS at 05:05

## 2022-05-07 NOTE — PATIENT INSTRUCTIONS
BE AWARE THAT THE MUSCLE RELAXANT CAN MAKE YOU SLEEPY, SO DO NOT TAKE BEFORE DRIVING OR OPERATING HEAVY MACHINERY.    FOLLOWING THE INJECTION THAT YOU RECEIVED TODAY IN THE CLINIC, YOU SHOULD NOT TAKE IBUPROFEN FOR AT LEAST 12 HOURS.     BE SURE TO FOLLOW-UP WITH YOUR PRIMARY CARE PROVIDER NEXT WEEK, ESPECIALLY SHOULD YOUR SYMPTOMS PERSIST.    Make sure that you follow up with your primary care doctor in the next 2-5 days if needed .  Return to the Urgent Care if signs or symptoms change and certainly if you have worsening symptoms go to the nearest emergency department for further evaluation.
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
## 2023-08-08 ENCOUNTER — OFFICE VISIT (OUTPATIENT)
Dept: URGENT CARE | Facility: CLINIC | Age: 43
End: 2023-08-08
Payer: COMMERCIAL

## 2023-08-08 VITALS
DIASTOLIC BLOOD PRESSURE: 77 MMHG | RESPIRATION RATE: 20 BRPM | OXYGEN SATURATION: 98 % | SYSTOLIC BLOOD PRESSURE: 126 MMHG | TEMPERATURE: 99 F | BODY MASS INDEX: 27.6 KG/M2 | HEIGHT: 62 IN | HEART RATE: 74 BPM | WEIGHT: 150 LBS

## 2023-08-08 DIAGNOSIS — R07.9 CHEST PAIN IN ADULT: Primary | ICD-10-CM

## 2023-08-08 LAB
CTP QC/QA: YES
SARS-COV-2 AG RESP QL IA.RAPID: NEGATIVE

## 2023-08-08 PROCEDURE — 99213 OFFICE O/P EST LOW 20 MIN: CPT | Mod: S$GLB,,, | Performed by: NURSE PRACTITIONER

## 2023-08-08 PROCEDURE — 99213 PR OFFICE/OUTPT VISIT, EST, LEVL III, 20-29 MIN: ICD-10-PCS | Mod: S$GLB,,, | Performed by: NURSE PRACTITIONER

## 2023-08-08 PROCEDURE — 93005 ELECTROCARDIOGRAM TRACING: CPT | Mod: S$GLB,,, | Performed by: NURSE PRACTITIONER

## 2023-08-08 PROCEDURE — 93010 ELECTROCARDIOGRAM REPORT: CPT | Mod: S$GLB,,, | Performed by: INTERNAL MEDICINE

## 2023-08-08 PROCEDURE — 93010 EKG 12-LEAD: ICD-10-PCS | Mod: S$GLB,,, | Performed by: INTERNAL MEDICINE

## 2023-08-08 PROCEDURE — 93005 EKG 12-LEAD: ICD-10-PCS | Mod: S$GLB,,, | Performed by: NURSE PRACTITIONER

## 2023-08-08 PROCEDURE — 87811 SARS-COV-2 COVID19 W/OPTIC: CPT | Mod: QW,S$GLB,, | Performed by: NURSE PRACTITIONER

## 2023-08-08 PROCEDURE — 87811 SARS CORONAVIRUS 2 ANTIGEN POCT, MANUAL READ: ICD-10-PCS | Mod: QW,S$GLB,, | Performed by: NURSE PRACTITIONER

## 2023-08-09 NOTE — PROGRESS NOTES
"Subjective:      Patient ID: Migdalia Grijalva is a 42 y.o. female.    Vitals:  height is 5' 2" (1.575 m) and weight is 68 kg (150 lb). Her oral temperature is 98.5 °F (36.9 °C). Her blood pressure is 126/77 and her pulse is 74. Her respiration is 20 and oxygen saturation is 98%.     Chief Complaint: Chest Pain (Left side chest pain)      Pt is a 41 yo female presenting with left sided chest pain.  Onset of symptoms was ~ 8 hours ago.    Denies dyspnea on exertion, irregular heartbeat, blurred vision, leg swelling, and syncope.           Chest Pain   This is a new problem. The current episode started today (11:00 am). The onset quality is sudden (thought it was gas pain,). The problem occurs constantly. The pain is at a severity of 6/10. The pain is moderate. The quality of the pain is described as heavy, pressure and squeezing. The pain does not radiate. Associated symptoms include dizziness, headaches and malaise/fatigue. Pertinent negatives include no abdominal pain, back pain, claudication, cough, diaphoresis, exertional chest pressure, fever, hemoptysis, irregular heartbeat, leg pain, lower extremity edema, nausea, near-syncope, numbness, orthopnea, palpitations, PND, shortness of breath, sputum production, syncope, vomiting or weakness. The pain is aggravated by movement and deep breathing. She has tried rest for the symptoms. The treatment provided mild relief. Risk factors include stress.   Her past medical history is significant for anxiety/panic attacks.   Pertinent negatives for past medical history include no seizures.   Her family medical history is significant for diabetes, heart disease, hypertension and stroke.       Constitution: Negative for sweating, fever and generalized weakness.   HENT:  Positive for postnasal drip. Negative for ear pain, tinnitus, congestion and sore throat.    Neck: Negative for neck pain and neck stiffness.   Cardiovascular:  Positive for chest pain. Negative for leg swelling, " palpitations, sob on exertion and passing out.   Eyes:  Negative for eye pain, eye redness, photophobia, vision loss, double vision and blurred vision.   Respiratory:  Negative for cough, sputum production, bloody sputum and shortness of breath.    Gastrointestinal:  Negative for abdominal pain, nausea and vomiting.   Musculoskeletal:  Negative for back pain.   Neurological:  Positive for dizziness and headaches. Negative for light-headedness, passing out, facial drooping, speech difficulty, coordination disturbances, loss of balance, disorientation, altered mental status, numbness, tingling and seizures.   Psychiatric/Behavioral:  Negative for altered mental status, disorientation and confusion.       Objective:     Physical Exam   Constitutional: She is oriented to person, place, and time.   HENT:   Head: Normocephalic.   Ears:   Right Ear: Hearing and external ear normal. No no drainage, swelling or tenderness. Tympanic membrane is not erythematous, not retracted and not bulging. No middle ear effusion.   Left Ear: Hearing and external ear normal. No no drainage, swelling or tenderness. Tympanic membrane is not erythematous, not retracted and not bulging.  No middle ear effusion.   Nose: Nose normal. No mucosal edema, rhinorrhea or purulent discharge. Right sinus exhibits no maxillary sinus tenderness and no frontal sinus tenderness. Left sinus exhibits no maxillary sinus tenderness and no frontal sinus tenderness.   Mouth/Throat: Uvula is midline, oropharynx is clear and moist and mucous membranes are normal. No uvula swelling. No oropharyngeal exudate, posterior oropharyngeal edema or posterior oropharyngeal erythema.   Cardiovascular: Normal rate, regular rhythm and normal heart sounds. PMI is not displaced. No thrill  Pulmonary/Chest: Effort normal and breath sounds normal. No accessory muscle usage or stridor. No respiratory distress. She has no decreased breath sounds. She has no wheezes. She has no  rhonchi. She has no rales. Chest wall is not dull to percussion. She exhibits bony tenderness. She exhibits no mass, no laceration, no crepitus, no edema, no deformity, no swelling and no retraction.   Chest pain reproducible with palpating left chest wall             Comments: Chest pain reproducible with palpating left chest wall    Musculoskeletal:      Right lower leg: No edema.      Left lower leg: No edema.   Neurological: She is alert and oriented to person, place, and time.   Skin: Skin is warm and dry.   Nursing note and vitals reviewed.    EKG  Vent rate=79  MS efcgeruj=257  QRS duration=92  QT/Zhw=194,415  Interpretation:  EKG: normal EKG, normal sinus rhythm.     Assessment:     1. Chest pain in adult      Pt is a 43 yo female presenting with left sided chest pain.  Onset of symptoms was ~ 8 hours ago.    Denies dyspnea on exertion, irregular heartbeat, blurred vision, leg swelling, and syncope.   Pt has no previous cardiac history. ACS risk low.  VSS stable.  normal EKG, normal sinus rhythm.  PE shows chest pain reproducible with left chest wall palpation, otherwise unremarkable PE.  I have considered potential emergent or life threatening causes of the patient's complaints including but not limited to ACS, PE, aortic dissection, PTX.  Based on the patient's history, exam, and diagnostic findings, I think these diagnoses to be highly unlikely at the present time.  Patient was briefed on my thought process and diagnosis.  The patient was in agreement with the current treatment plan and understands to proceed immediately to the ER should new or worsening symptoms occur, as discussed.     Plan:       Chest pain in adult  -     SARS Coronavirus 2 Antigen, POCT Manual Read      Patient Instructions   Chest pain in adult    -     SARS Coronavirus 2 Antigen, POCT Manual Read    Results for orders placed or performed in visit on 08/08/23   SARS Coronavirus 2 Antigen, POCT Manual Read   Result Value Ref Range     SARS Coronavirus 2 Antigen Negative Negative     Acceptable Yes        - Take OTC Gas-x (simethicone) for abdominal discomfort.     - Drink plenty of electrolytes and fluids.        STRICT ED Precautions    Report to ED or call 911 if shortness of breath, irregular heartbeat, blurred vision, leg swelling, syncope, speech changes, weakness, or sensory loss.        What is costochondritis? -- Costochondritis is a condition that causes pain and tenderness in your chest. The pain happens in an area called the costosternal joints, where the ribs meet the breastbone.  The pain from costochondritis affects only a small area and does not always get worse when you move around.  What causes costochondritis? -- Most of the time, doctors don't know why people get costochondritis. But in some people, it might be caused by:  A blow to the chest  Heavy lifting or hard exercise  An illness that causes you to cough and sneeze  What are the symptoms of costochondritis? -- The symptoms include:  Pain and tenderness in the chest - The pain can be sharp, or it might be dull and gnawing  Pain when you take a deep breath  Pain when you cough  Is there a test for costochondritis? -- No. There is no test. But your doctor or nurse should be able to tell if you have it by learning about your symptoms and doing an exam. Sometimes, they might do other tests to make sure you do not have a different problem. ]

## 2023-08-09 NOTE — PATIENT INSTRUCTIONS
Chest pain in adult    -     SARS Coronavirus 2 Antigen, POCT Manual Read    Results for orders placed or performed in visit on 08/08/23   SARS Coronavirus 2 Antigen, POCT Manual Read   Result Value Ref Range    SARS Coronavirus 2 Antigen Negative Negative     Acceptable Yes        - Take OTC Gas-x (simethicone) for abdominal discomfort.     - Drink plenty of electrolytes and fluids.        STRICT ED Precautions    Report to ED or call 911 if shortness of breath, irregular heartbeat, blurred vision, leg swelling, syncope, speech changes, weakness, or sensory loss.        What is costochondritis? -- Costochondritis is a condition that causes pain and tenderness in your chest. The pain happens in an area called the costosternal joints, where the ribs meet the breastbone.  The pain from costochondritis affects only a small area and does not always get worse when you move around.  What causes costochondritis? -- Most of the time, doctors don't know why people get costochondritis. But in some people, it might be caused by:  A blow to the chest  Heavy lifting or hard exercise  An illness that causes you to cough and sneeze  What are the symptoms of costochondritis? -- The symptoms include:  Pain and tenderness in the chest - The pain can be sharp, or it might be dull and gnawing  Pain when you take a deep breath  Pain when you cough  Is there a test for costochondritis? -- No. There is no test. But your doctor or nurse should be able to tell if you have it by learning about your symptoms and doing an exam. Sometimes, they might do other tests to make sure you do not have a different problem.

## 2023-09-18 ENCOUNTER — OFFICE VISIT (OUTPATIENT)
Dept: ORTHOPEDICS | Facility: CLINIC | Age: 43
End: 2023-09-18
Payer: COMMERCIAL

## 2023-09-18 ENCOUNTER — TELEPHONE (OUTPATIENT)
Dept: ORTHOPEDICS | Facility: CLINIC | Age: 43
End: 2023-09-18
Payer: COMMERCIAL

## 2023-09-18 ENCOUNTER — HOSPITAL ENCOUNTER (OUTPATIENT)
Dept: RADIOLOGY | Facility: HOSPITAL | Age: 43
Discharge: HOME | End: 2023-09-18
Attending: ORTHOPAEDIC SURGERY
Payer: COMMERCIAL

## 2023-09-18 VITALS — BODY MASS INDEX: 27.44 KG/M2 | HEIGHT: 62 IN

## 2023-09-18 DIAGNOSIS — M25.511 RIGHT SHOULDER PAIN, UNSPECIFIED CHRONICITY: ICD-10-CM

## 2023-09-18 DIAGNOSIS — M25.511 RIGHT SHOULDER PAIN, UNSPECIFIED CHRONICITY: Primary | ICD-10-CM

## 2023-09-18 DIAGNOSIS — M25.511 CHRONIC RIGHT SHOULDER PAIN: Primary | ICD-10-CM

## 2023-09-18 DIAGNOSIS — G89.29 CHRONIC RIGHT SHOULDER PAIN: Primary | ICD-10-CM

## 2023-09-18 PROCEDURE — 1159F MED LIST DOCD IN RCRD: CPT | Mod: CPTII,S$GLB,, | Performed by: ORTHOPAEDIC SURGERY

## 2023-09-18 PROCEDURE — 99203 OFFICE O/P NEW LOW 30 MIN: CPT | Mod: S$GLB,,, | Performed by: ORTHOPAEDIC SURGERY

## 2023-09-18 PROCEDURE — 1159F PR MEDICATION LIST DOCUMENTED IN MEDICAL RECORD: ICD-10-PCS | Mod: CPTII,S$GLB,, | Performed by: ORTHOPAEDIC SURGERY

## 2023-09-18 PROCEDURE — 3008F BODY MASS INDEX DOCD: CPT | Mod: CPTII,S$GLB,, | Performed by: ORTHOPAEDIC SURGERY

## 2023-09-18 PROCEDURE — 3008F PR BODY MASS INDEX (BMI) DOCUMENTED: ICD-10-PCS | Mod: CPTII,S$GLB,, | Performed by: ORTHOPAEDIC SURGERY

## 2023-09-18 PROCEDURE — 99999 PR PBB SHADOW E&M-EST. PATIENT-LVL III: ICD-10-PCS | Mod: PBBFAC,,, | Performed by: ORTHOPAEDIC SURGERY

## 2023-09-18 PROCEDURE — 73030 XR SHOULDER COMPLETE 2 OR MORE VIEWS RIGHT: ICD-10-PCS | Mod: 26,RT,, | Performed by: RADIOLOGY

## 2023-09-18 PROCEDURE — 73030 X-RAY EXAM OF SHOULDER: CPT | Mod: TC,PN,RT

## 2023-09-18 PROCEDURE — 73030 X-RAY EXAM OF SHOULDER: CPT | Mod: 26,RT,, | Performed by: RADIOLOGY

## 2023-09-18 PROCEDURE — 99203 PR OFFICE/OUTPT VISIT, NEW, LEVL III, 30-44 MIN: ICD-10-PCS | Mod: S$GLB,,, | Performed by: ORTHOPAEDIC SURGERY

## 2023-09-18 PROCEDURE — 99999 PR PBB SHADOW E&M-EST. PATIENT-LVL III: CPT | Mod: PBBFAC,,, | Performed by: ORTHOPAEDIC SURGERY

## 2023-09-18 RX ORDER — CLONAZEPAM 0.5 MG/1
0.5 TABLET ORAL 2 TIMES DAILY
COMMUNITY
Start: 2023-09-06

## 2023-09-18 RX ORDER — ATORVASTATIN CALCIUM 10 MG/1
10 TABLET, FILM COATED ORAL NIGHTLY
COMMUNITY
Start: 2023-09-13

## 2023-09-18 RX ORDER — TRAZODONE HYDROCHLORIDE 50 MG/1
25-50 TABLET ORAL NIGHTLY PRN
COMMUNITY
Start: 2023-09-01

## 2023-09-18 RX ORDER — DEXTROAMPHETAMINE SACCHARATE, AMPHETAMINE ASPARTATE, DEXTROAMPHETAMINE SULFATE AND AMPHETAMINE SULFATE 2.5; 2.5; 2.5; 2.5 MG/1; MG/1; MG/1; MG/1
1 TABLET ORAL 2 TIMES DAILY
COMMUNITY
Start: 2023-09-15

## 2023-09-18 RX ORDER — FLUOXETINE 10 MG/1
CAPSULE ORAL
COMMUNITY
Start: 2023-09-01

## 2023-09-18 NOTE — PROGRESS NOTES
"Subjective:      Patient ID: Migdalia Grijalva is a 42 y.o. female.    Chief Complaint: Pain of the Right Shoulder      HPI  Migdalia Grijalva is a  42 y.o. female presenting today for right shoulder pain.  There was not a history of trauma.  Onset of symptoms began several years ago   She is actually been treated elsewhere with injections from time to time which worked temporarily but then symptoms do recur   She is reporting pain in the right shoulder difficulty with use particularly elevation and pain at night   No numbness or tingling is reported   She has had an x-ray but not an MRI scan   .      Review of patient's allergies indicates:   Allergen Reactions    Imitrex [sumatriptan] Shortness Of Breath     Chest tightness    Sumatriptan succinate      Other reaction(s): chest pain  Other reaction(s): Shortness of breath         Current Outpatient Medications   Medication Sig Dispense Refill    atorvastatin (LIPITOR) 10 MG tablet Take 10 mg by mouth every evening.      clonazePAM (KLONOPIN) 0.5 MG tablet Take 0.5 mg by mouth 2 (two) times daily.      cyclobenzaprine (FLEXERIL) 10 MG tablet 1/2-1 tablet 3 times daily as needed. 30 tablet 0    dextroamphetamine-amphetamine 10 mg Tab Take 1 tablet by mouth 2 (two) times daily.      FLUoxetine 10 MG capsule Take by mouth.      traZODone (DESYREL) 50 MG tablet Take 25-50 mg by mouth nightly as needed.       No current facility-administered medications for this visit.       Past Medical History:   Diagnosis Date    Acne     Acne     ADD (attention deficit disorder)     Allergy     Anxiety     Depression     GERD (gastroesophageal reflux disease)     Meningitis     at age 15       Past Surgical History:   Procedure Laterality Date    ABCESS DRAINAGE      of parotid gland due to mumps at age 7    CHOLECYSTECTOMY  2005    TONSILLECTOMY      TUBAL LIGATION  2003    TYMPANOSTOMY TUBE PLACEMENT         Review of Systems:  ROS    OBJECTIVE:     PHYSICAL EXAM:  Height: 5' 2" (157.5 cm)  " "  Vitals:    09/18/23 1355   Height: 5' 2" (1.575 m)   PainSc:   7   PainLoc: Shoulder     Well developed, well nourished female in no acute distress  Alert and oriented x 3  HEENT- Normal exam  Lungs- Clear to auscultation  Heart- Regular rate and rhythm  Abdomen- Soft nontender  Extremity exam- examination right shoulder no tenderness no swelling  No bruising   Range of motion is full but she does have a positive impingement sign with abduction internal rotation of the shoulder  Positive supraspinatus stress test  No instability neurologic exam intact    RADIOGRAPHS:  AP lateral x-ray right shoulder show no bony abnormalities  Comments: I have personally reviewed the imaging and I agree with the above radiologist's report.    ASSESSMENT/PLAN:     IMPRESSION:  1. Right shoulder pain chronic.      2. Possible rotator cuff tear right shoulder    PLAN:  Because of the duration of her symptoms for more than a year I have ordered an MRI scan right shoulder   In the meantime avoid overhead lifting   Continue Advil or Motrin by mouth   Continue home exercise program  Follow up after the MRI is complete       - We talked at length about the anatomy and pathophysiology of   Encounter Diagnosis   Name Primary?    Chronic right shoulder pain Yes           Disclaimer: This note has been generated using voice-recognition software. There may be typographical errors that have been missed during proof-reading.     "

## 2023-09-20 ENCOUNTER — HOSPITAL ENCOUNTER (OUTPATIENT)
Dept: RADIOLOGY | Facility: HOSPITAL | Age: 43
Discharge: HOME | End: 2023-09-20
Attending: ORTHOPAEDIC SURGERY
Payer: COMMERCIAL

## 2023-09-20 DIAGNOSIS — M25.511 CHRONIC RIGHT SHOULDER PAIN: ICD-10-CM

## 2023-09-20 DIAGNOSIS — G89.29 CHRONIC RIGHT SHOULDER PAIN: ICD-10-CM

## 2023-09-20 PROCEDURE — 73221 MRI JOINT UPR EXTREM W/O DYE: CPT | Mod: TC,RT

## 2023-09-20 PROCEDURE — 73221 MRI JOINT UPR EXTREM W/O DYE: CPT | Mod: 26,RT,, | Performed by: RADIOLOGY

## 2023-09-20 PROCEDURE — 73221 MRI SHOULDER WITHOUT CONTRAST RIGHT: ICD-10-PCS | Mod: 26,RT,, | Performed by: RADIOLOGY

## 2023-10-09 ENCOUNTER — OFFICE VISIT (OUTPATIENT)
Dept: ORTHOPEDICS | Facility: CLINIC | Age: 43
End: 2023-10-09
Payer: COMMERCIAL

## 2023-10-09 DIAGNOSIS — M75.121 NONTRAUMATIC COMPLETE TEAR OF RIGHT ROTATOR CUFF: ICD-10-CM

## 2023-10-09 PROCEDURE — 99213 OFFICE O/P EST LOW 20 MIN: CPT | Mod: 95,,, | Performed by: ORTHOPAEDIC SURGERY

## 2023-10-09 PROCEDURE — 99213 PR OFFICE/OUTPT VISIT, EST, LEVL III, 20-29 MIN: ICD-10-PCS | Mod: 95,,, | Performed by: ORTHOPAEDIC SURGERY

## 2023-10-09 NOTE — PROGRESS NOTES
Established Patient - Audio Only Telehealth Visit     The patient location is:  At home  The chief complaint leading to consultation is:  Right shoulder pain  Visit type: Virtual visit with audio only (telephone)  Total time spent with patient:  10 minutes       The reason for the audio only service rather than synchronous audio and video virtual visit was related to technical difficulties or patient preference/necessity.     Each patient to whom I provide medical services by telemedicine is:  (1) informed of the relationship between the physician and patient and the respective role of any other health care provider with respect to management of the patient; and (2) notified that they may decline to receive medical services by telemedicine and may withdraw from such care at any time. Patient verbally consented to receive this service via voice-only telephone call.       HPI:  42-year-old female with ongoing symptoms right shoulder for the past 6 months        Assessment and plan:  Recent MRI scan of the right shoulder was reviewed with the patient   The MRI shows full-thickness rotator cuff tear with minimal retraction   I went over these findings with the patient   I have recommended she consider surgery for the  right shoulder for right shoulder arthroscopy and rotator cuff repair   She will follow-up in the office to schedule surgery at a convenient time   Continue Advil or Motrin by mouth                        This service was not originating from a related E/M service provided within the previous 7 days nor will  to an E/M service or procedure within the next 24 hours or my soonest available appointment.  Prevailing standard of care was able to be met in this audio-only visit.

## 2023-10-13 ENCOUNTER — PATIENT MESSAGE (OUTPATIENT)
Dept: ORTHOPEDICS | Facility: CLINIC | Age: 43
End: 2023-10-13

## 2023-10-13 RX ORDER — TRAMADOL HYDROCHLORIDE 50 MG/1
50 TABLET ORAL EVERY 6 HOURS PRN
Qty: 30 TABLET | Refills: 0 | Status: SHIPPED | OUTPATIENT
Start: 2023-10-13 | End: 2023-10-23

## 2024-08-20 ENCOUNTER — OFFICE VISIT (OUTPATIENT)
Dept: URGENT CARE | Facility: CLINIC | Age: 44
End: 2024-08-20
Payer: COMMERCIAL

## 2024-08-20 VITALS
WEIGHT: 150 LBS | DIASTOLIC BLOOD PRESSURE: 60 MMHG | BODY MASS INDEX: 27.6 KG/M2 | OXYGEN SATURATION: 98 % | HEART RATE: 70 BPM | SYSTOLIC BLOOD PRESSURE: 110 MMHG | RESPIRATION RATE: 20 BRPM | TEMPERATURE: 97 F | HEIGHT: 62 IN

## 2024-08-20 DIAGNOSIS — H92.02 LEFT EAR PAIN: ICD-10-CM

## 2024-08-20 DIAGNOSIS — J32.9 SINUSITIS, UNSPECIFIED CHRONICITY, UNSPECIFIED LOCATION: ICD-10-CM

## 2024-08-20 DIAGNOSIS — J02.9 SORE THROAT: ICD-10-CM

## 2024-08-20 DIAGNOSIS — H66.90 OTITIS MEDIA, UNSPECIFIED LATERALITY, UNSPECIFIED OTITIS MEDIA TYPE: Primary | ICD-10-CM

## 2024-08-20 LAB
CTP QC/QA: YES
SARS-COV-2 AG RESP QL IA.RAPID: NEGATIVE

## 2024-08-20 PROCEDURE — 99213 OFFICE O/P EST LOW 20 MIN: CPT | Mod: S$GLB,,,

## 2024-08-20 PROCEDURE — 87811 SARS-COV-2 COVID19 W/OPTIC: CPT | Mod: QW,S$GLB,,

## 2024-08-20 RX ORDER — AZITHROMYCIN 250 MG/1
TABLET, FILM COATED ORAL
Qty: 6 TABLET | Refills: 0 | Status: SHIPPED | OUTPATIENT
Start: 2024-08-20 | End: 2024-08-25

## 2024-08-20 RX ORDER — CETIRIZINE HYDROCHLORIDE 10 MG/1
10 TABLET ORAL DAILY
Qty: 30 TABLET | Refills: 0 | Status: SHIPPED | OUTPATIENT
Start: 2024-08-20 | End: 2024-09-19

## 2024-08-20 RX ORDER — LIDOCAINE HYDROCHLORIDE 20 MG/ML
SOLUTION OROPHARYNGEAL EVERY 4 HOURS
Qty: 100 ML | Refills: 0 | Status: SHIPPED | OUTPATIENT
Start: 2024-08-20

## 2024-08-20 NOTE — PATIENT INSTRUCTIONS
Please drink plenty of fluids.  Please get plenty of rest.  Please return here or go to the Emergency Department for any concerns or worsening of condition.  If you were prescribed antibiotics, please take them to completion.    Recommended for patient to drink hot tea with honey. Consider eating softer foods such as soup and broth for the next couple of days to prevent further throat irritation. Recommended for patient to refrain from acidic foods (such as tomatoes or caffeine) to prevent throat irritation for the next couple of days.    If you do not have Hypertension or any history of palpitations, it is ok to take over the counter Sudafed or Mucinex D or Allegra-D or Claritin-D or Zyrtec-D.  CALL [phone] TO SCHEDULE APPOINTMENT WITH SPECIALIST IF YOU HAVE NOT RECEIVED A CALL BY EITHER TODAY OR BY TOMORROW.   Recommend otc benadryl allergy + congestion.   If you do take one of the above, it is ok to combine that with plain over the counter Mucinex or Allegra or Claritin or Zyrtec.  If for example you are taking Zyrtec -D, you can combine that with Mucinex, but not Mucinex-D.  If you are taking Mucinex-D, you can combine that with plain Allegra or Claritin or Zyrtec.   If you do have Hypertension or palpitations, it is safe to take Coricidin HBP for relief of sinus symptoms.  If not allergic, please take over the counter Tylenol (Acetaminophen) and/or Motrin (Ibuprofen) as directed for control of pain and/or fever.  Please follow up with your primary care doctor or specialist as needed.    If you  smoke, please stop smoking.

## 2024-08-20 NOTE — PROGRESS NOTES
"Subjective:      Patient ID: Migdalia Grijalva is a 43 y.o. female.    Vitals:  height is 5' 2" (1.575 m) and weight is 68 kg (150 lb). Her oral temperature is 97.2 °F (36.2 °C). Her blood pressure is 110/60 and her pulse is 70. Her respiration is 20 and oxygen saturation is 98%.     Chief Complaint: Sinus Problem    43-year-old female presents to the clinic today with chief complaint of sinus congestion, sneezing, left ear pain, sore throat, post nasal drip . Symptoms started one week ago and have worsened.  Patient has tried benadryl.  She states her coworkers have sick with similar sx.  Denies any recent travel.  She does have a history of seasonal allergies. Denies hx of asthma. Denies numbness or tingling. Denies radiation of pain. Denies fever, chills, body aches, chest pain, shortness of breath, wheezing, abdominal pain, nausea, vomiting, diarrhea, or rashes.      Sinus Problem  This is a new problem. The current episode started in the past 7 days. The problem has been gradually worsening since onset. There has been no fever. Associated symptoms include congestion, ear pain, sinus pressure and sneezing. Pertinent negatives include no chills, coughing, diaphoresis, headaches, neck pain, shortness of breath or sore throat.       Constitution: Negative for activity change, chills, sweating, fatigue, fever, generalized weakness and international travel in last 60 days.   HENT:  Positive for ear pain, congestion, postnasal drip and sinus pressure. Negative for sore throat, trouble swallowing and voice change.    Neck: Negative for neck pain.   Cardiovascular:  Negative for chest pain.   Eyes:  Negative for eye pain.   Respiratory:  Negative for cough, shortness of breath, wheezing and asthma.    Gastrointestinal:  Negative for abdominal pain, nausea, vomiting and diarrhea.   Genitourinary:  Negative for dysuria.   Musculoskeletal:  Negative for pain and muscle ache.   Skin:  Negative for rash.   Allergic/Immunologic: " Positive for sneezing. Negative for environmental allergies, seasonal allergies and asthma.   Neurological:  Negative for dizziness and headaches.   Psychiatric/Behavioral:  Negative for nervous/anxious. The patient is not nervous/anxious.       Objective:     Physical Exam   Constitutional: She is oriented to person, place, and time. She appears well-developed. She is cooperative.  Non-toxic appearance. She does not appear ill. No distress.   HENT:   Head: Normocephalic and atraumatic.   Ears:   Right Ear: Hearing, tympanic membrane, external ear and ear canal normal.   Left Ear: Hearing, tympanic membrane, external ear and ear canal normal.   Nose: Mucosal edema present. No rhinorrhea, purulent discharge or nasal deformity. No epistaxis. Right sinus exhibits maxillary sinus tenderness. Right sinus exhibits no frontal sinus tenderness. Left sinus exhibits maxillary sinus tenderness. Left sinus exhibits no frontal sinus tenderness.   Mouth/Throat: Uvula is midline, oropharynx is clear and moist and mucous membranes are normal. No trismus in the jaw. Normal dentition. No uvula swelling. Cobblestoning present. No oropharyngeal exudate, posterior oropharyngeal edema, posterior oropharyngeal erythema or tonsillar abscesses.   Eyes: Conjunctivae and lids are normal. No scleral icterus. Extraocular movement intact periorbital hyperpigmentation   Neck: Trachea normal and phonation normal. Neck supple. No edema present. No erythema present. No neck rigidity present.   Cardiovascular: Normal rate, regular rhythm, normal heart sounds and normal pulses.   Pulmonary/Chest: Effort normal and breath sounds normal. No stridor. No respiratory distress. She has no decreased breath sounds. She has no wheezes. She has no rhonchi. She has no rales.   Abdominal: Normal appearance.   Musculoskeletal: Normal range of motion.         General: No deformity. Normal range of motion.   Lymphadenopathy:     She has cervical adenopathy.    Neurological: She is alert and oriented to person, place, and time. She exhibits normal muscle tone. Coordination normal.   Skin: Skin is warm, dry, intact, not diaphoretic and not pale.   Psychiatric: Her speech is normal and behavior is normal. Judgment and thought content normal.   Nursing note and vitals reviewed.      Assessment:     1. Otitis media, unspecified laterality, unspecified otitis media type    2. Left ear pain    3. Sinusitis, unspecified chronicity, unspecified location    4. Sore throat        Results for orders placed or performed in visit on 08/20/24   SARS Coronavirus 2 Antigen, POCT Manual Read   Result Value Ref Range    SARS Coronavirus 2 Antigen Negative Negative     Acceptable Yes        Plan:       Otitis media, unspecified laterality, unspecified otitis media type  -     azithromycin (Z-TYRELL) 250 MG tablet; Take 2 tablets by mouth on day 1; Take 1 tablet by mouth on days 2-5  Dispense: 6 tablet; Refill: 0  -     cetirizine (ZYRTEC) 10 MG tablet; Take 1 tablet (10 mg total) by mouth once daily.  Dispense: 30 tablet; Refill: 0    Left ear pain  -     SARS Coronavirus 2 Antigen, POCT Manual Read    Sinusitis, unspecified chronicity, unspecified location  -     azithromycin (Z-TYRELL) 250 MG tablet; Take 2 tablets by mouth on day 1; Take 1 tablet by mouth on days 2-5  Dispense: 6 tablet; Refill: 0  -     cetirizine (ZYRTEC) 10 MG tablet; Take 1 tablet (10 mg total) by mouth once daily.  Dispense: 30 tablet; Refill: 0  -     Ambulatory referral/consult to Allergy    Sore throat  -     LIDOcaine viscous HCl 2% (XYLOCAINE) 2 % Soln; by Mucous Membrane route every 4 (four) hours. Swish and spit 15 mL every 4 hours.  Dispense: 100 mL; Refill: 0      We had shared decision making for patient's treatment. We discussed side effects/alternatives/benefits/risk and patient would like to proceed with treatment plan. We also discussed other OTC treatment recommendations. Patient was  counseled, explained with the test results meaning, expected course, and answered all of questions. Patient can take OTC Acetaminophen (Tylenol) and/or Ibuprofen (Motrin) as needed for pain relief and/or fever relief. Continue to drink plenty of fluids. Follow up with PCP in the next 1-2 weeks as needed.  Gave patient strict ER/urgent care precautions in case symptoms worsen or if any new concerns arise.

## 2024-08-20 NOTE — LETTER
August 20, 2024      [facility] [address]  [place] [address]  Phone: [phone]  Fax: [phone]       Patient: Migdalia Grijalva   YOB: 1980  Date of Visit: 08/20/2024    To Whom It May Concern:    Hemanth Grijalva  was at [facility] on 08/20/2024. The patient may return to work/school on 8/21/24 with no restrictions. If you have any questions or concerns, or if I can be of further assistance, please do not hesitate to contact me.    Sincerely,    Radha Berman PA-C

## 2024-08-27 ENCOUNTER — OFFICE VISIT (OUTPATIENT)
Dept: ALLERGY | Facility: CLINIC | Age: 44
End: 2024-08-27
Payer: COMMERCIAL

## 2024-08-27 ENCOUNTER — LAB VISIT (OUTPATIENT)
Dept: LAB | Facility: HOSPITAL | Age: 44
End: 2024-08-27
Payer: COMMERCIAL

## 2024-08-27 VITALS — HEIGHT: 62 IN | BODY MASS INDEX: 29.3 KG/M2 | WEIGHT: 159.19 LBS

## 2024-08-27 DIAGNOSIS — J31.0 CHRONIC RHINITIS: Primary | ICD-10-CM

## 2024-08-27 DIAGNOSIS — J02.9 PHARYNGITIS, UNSPECIFIED ETIOLOGY: ICD-10-CM

## 2024-08-27 DIAGNOSIS — J31.0 CHRONIC RHINITIS: ICD-10-CM

## 2024-08-27 PROCEDURE — 99204 OFFICE O/P NEW MOD 45 MIN: CPT | Mod: S$GLB,,, | Performed by: ALLERGY & IMMUNOLOGY

## 2024-08-27 PROCEDURE — 36415 COLL VENOUS BLD VENIPUNCTURE: CPT | Mod: PO | Performed by: ALLERGY & IMMUNOLOGY

## 2024-08-27 PROCEDURE — 1159F MED LIST DOCD IN RCRD: CPT | Mod: CPTII,S$GLB,, | Performed by: ALLERGY & IMMUNOLOGY

## 2024-08-27 PROCEDURE — 3008F BODY MASS INDEX DOCD: CPT | Mod: CPTII,S$GLB,, | Performed by: ALLERGY & IMMUNOLOGY

## 2024-08-27 PROCEDURE — 86003 ALLG SPEC IGE CRUDE XTRC EA: CPT | Performed by: ALLERGY & IMMUNOLOGY

## 2024-08-27 PROCEDURE — 99999 PR PBB SHADOW E&M-EST. PATIENT-LVL III: CPT | Mod: PBBFAC,,, | Performed by: ALLERGY & IMMUNOLOGY

## 2024-08-27 PROCEDURE — 1160F RVW MEDS BY RX/DR IN RCRD: CPT | Mod: CPTII,S$GLB,, | Performed by: ALLERGY & IMMUNOLOGY

## 2024-08-27 PROCEDURE — 86003 ALLG SPEC IGE CRUDE XTRC EA: CPT | Mod: 59 | Performed by: ALLERGY & IMMUNOLOGY

## 2024-08-27 NOTE — PROGRESS NOTES
Subjective:       Patient ID: Migdalia Grijalva is a 43 y.o. female.    Chief Complaint:  Sore Throat      42 yo woman presents for consult from MARTHA Berman for possible allergies. She states has had symptoms for years. Has fatigue. Has sore throat typically for few days each week but last week had for 6 straight days so went to . also had fluid in left ear so given z pack. She also has runny nose and may PND> no sneeze, no itchy nose or eyes. She clears her throat but no chest symptoms. She takes benadryl qhs but not sure if it helps. Has all year. Is worse mid morning. No triggers. No H/O asthma or eczema. No food, insect or latex allergy. She had tonsils out at 2 and 2 sets ear tubes. About 10 years ago had sore throat but also hoarse every AM so seen and found to have lingual tonsils so had removed then. No other medical issues.         Environmental History: see history section for home environment  Review of Systems   Constitutional:  Positive for fatigue.   HENT:  Positive for congestion, ear pain, postnasal drip, rhinorrhea, sinus pressure, sore throat and voice change. Negative for ear discharge, facial swelling, mouth sores, nosebleeds and sneezing.    Eyes:  Negative for discharge and redness.   Respiratory:  Negative for cough, chest tightness, shortness of breath and wheezing.    Skin:  Negative for color change and rash.        Objective:      Physical Exam  Vitals and nursing note reviewed.   Constitutional:       General: She is not in acute distress.     Appearance: Normal appearance. She is not ill-appearing.   HENT:      Nose: No rhinorrhea.   Eyes:      General:         Right eye: No discharge.         Left eye: No discharge.      Conjunctiva/sclera: Conjunctivae normal.   Pulmonary:      Effort: Pulmonary effort is normal. No respiratory distress.   Abdominal:      General: There is no distension.   Skin:     General: Skin is warm and dry.      Findings: No erythema or rash.   Neurological:       Mental Status: She is alert and oriented to person, place, and time.   Psychiatric:         Mood and Affect: Mood normal.         Behavior: Behavior normal.         Laboratory:   none performed   Assessment:       1. Chronic rhinitis    2. Pharyngitis, unspecified etiology         Plan:       Advised symptoms can be allergic rhinitis vs chronic non allergic vs sinus issue vs LPR, will send immunocaps to assess if allergic. If not needs ENT eval  Phone review  MARTHA Berman notified of completed consult via Epic    I spent a total of 45 minutes on the day of the visit.  This includes face to face time and non-face to face time preparing to see the patient (eg, review of tests), obtaining and/or reviewing separately obtained history, documenting clinical information in the electronic or other health record, independently interpreting results and communicating results to the patient/family/caregiver, or care coordinator.

## 2024-08-30 LAB
A ALTERNATA IGE QN: <0.1 KU/L
A FUMIGATUS IGE QN: <0.1 KU/L
ALLERGEN CHAETOMIUM GLOBOSUM IGE: <0.1 KU/L
ALLERGEN WALNUT TREE IGE: <0.1 KU/L
ALLERGEN WHITE PINE TREE IGE: <0.1 KU/L
BAHIA GRASS IGE QN: <0.1 KU/L
BALD CYPRESS IGE QN: <0.1 KU/L
BERMUDA GRASS IGE QN: <0.1 KU/L
C HERBARUM IGE QN: <0.1 KU/L
C LUNATA IGE QN: <0.1 KU/L
CAT DANDER IGE QN: 1.27 KU/L
COMMON RAGWEED IGE QN: <0.1 KU/L
COTTONWOOD IGE QN: <0.1 KU/L
D FARINAE IGE QN: 0.98 KU/L
D PTERONYSS IGE QN: 1.06 KU/L
DEPRECATED LONDON PLANE IGE RAST QL: NORMAL
DEPRECATED TIMOTHY IGE RAST QL: ABNORMAL
DOG DANDER IGE QN: 0.44 KU/L
ELDER IGE QN: <0.1 KU/L
ENGL PLANTAIN IGE QN: <0.1 KU/L
HORSE DANDER IGE QN: <0.1 KU/L
JOHNSON GRASS IGE QN: <0.1 KU/L
LONDON PLANE IGE QN: <0.1 KU/L
MUGWORT IGE QN: <0.1 KU/L
P NOTATUM IGE QN: <0.1 KU/L
PECAN/HICK TREE IGE QN: <0.1 KU/L
RAST CLASS: ABNORMAL
RAST CLASS: NORMAL
S ROSTRATA IGE QN: <0.1 KU/L
SALTWORT IGE QN: <0.1 KU/L
SILVER BIRCH IGE QN: <0.1 KU/L
TIMOTHY IGE QN: 0.45 KU/L
WEST RAGWEED IGE QN: <0.1 KU/L
WHITE OAK IGE QN: <0.1 KU/L
WILLOW IGE QN: <0.1 KU/L

## 2024-09-03 ENCOUNTER — PATIENT MESSAGE (OUTPATIENT)
Dept: ALLERGY | Facility: CLINIC | Age: 44
End: 2024-09-03
Payer: COMMERCIAL

## 2024-10-03 ENCOUNTER — OFFICE VISIT (OUTPATIENT)
Dept: URGENT CARE | Facility: CLINIC | Age: 44
End: 2024-10-03
Payer: COMMERCIAL

## 2024-10-03 VITALS
HEIGHT: 62 IN | HEART RATE: 76 BPM | WEIGHT: 159.19 LBS | DIASTOLIC BLOOD PRESSURE: 68 MMHG | OXYGEN SATURATION: 99 % | BODY MASS INDEX: 29.3 KG/M2 | TEMPERATURE: 99 F | SYSTOLIC BLOOD PRESSURE: 117 MMHG | RESPIRATION RATE: 20 BRPM

## 2024-10-03 DIAGNOSIS — J02.9 SORE THROAT: ICD-10-CM

## 2024-10-03 DIAGNOSIS — Z91.09 ENVIRONMENTAL ALLERGIES: Primary | ICD-10-CM

## 2024-10-03 LAB
CTP QC/QA: YES
MOLECULAR STREP A: NEGATIVE

## 2024-10-03 PROCEDURE — 99214 OFFICE O/P EST MOD 30 MIN: CPT | Mod: S$GLB,,, | Performed by: FAMILY MEDICINE

## 2024-10-03 PROCEDURE — 87651 STREP A DNA AMP PROBE: CPT | Mod: QW,S$GLB,, | Performed by: FAMILY MEDICINE

## 2024-10-03 RX ORDER — DEXAMETHASONE 4 MG/1
8 TABLET ORAL DAILY
Qty: 6 TABLET | Refills: 0 | Status: SHIPPED | OUTPATIENT
Start: 2024-10-03 | End: 2024-10-06

## 2024-10-03 RX ORDER — PROMETHAZINE HYDROCHLORIDE AND DEXTROMETHORPHAN HYDROBROMIDE 6.25; 15 MG/5ML; MG/5ML
5 SYRUP ORAL EVERY 8 HOURS PRN
Qty: 180 ML | Refills: 0 | Status: SHIPPED | OUTPATIENT
Start: 2024-10-03 | End: 2024-10-13

## 2024-10-03 RX ORDER — CLONAZEPAM 0.5 MG/1
0.5 TABLET ORAL 2 TIMES DAILY
COMMUNITY
Start: 2024-09-27

## 2024-10-03 RX ORDER — VALACYCLOVIR HYDROCHLORIDE 1 G/1
1000 TABLET, FILM COATED ORAL 2 TIMES DAILY
COMMUNITY
Start: 2024-09-27

## 2024-10-03 NOTE — PROGRESS NOTES
"Subjective:      Patient ID: Migdalia Grijalva is a 43 y.o. female.    Vitals:  height is 5' 2" (1.575 m) and weight is 72.2 kg (159 lb 2.8 oz). Her oral temperature is 98.6 °F (37 °C). Her blood pressure is 117/68 and her pulse is 76. Her respiration is 20 and oxygen saturation is 99%.     Chief Complaint: Sore Throat    Pt comes in with a sore throat, right ear pain, headache, eye pain, sx started 3 days ago, at home tx includes mucinex, benadryl gives no relief.    Sore Throat   This is a new problem. The current episode started in the past 7 days. The problem has been unchanged. Sore throat worse side: both. There has been no fever. The pain is at a severity of 8/10. The pain is moderate. Associated symptoms include ear pain, headaches and trouble swallowing. Pertinent negatives include no abdominal pain, congestion, coughing, diarrhea, drooling, ear discharge, hoarse voice, plugged ear sensation, neck pain, shortness of breath, stridor, swollen glands or vomiting. She has had no exposure to strep or mono. Treatments tried: mucinex, benadryl. The treatment provided no relief.       HENT:  Positive for ear pain, sore throat and trouble swallowing. Negative for ear discharge, drooling and congestion.    Neck: Negative for neck pain.   Respiratory:  Negative for cough, shortness of breath and stridor.    Gastrointestinal:  Negative for abdominal pain, vomiting and diarrhea.   Neurological:  Positive for headaches.      Objective:     Physical Exam   Constitutional: She is oriented to person, place, and time. She appears well-developed. She is cooperative.  Non-toxic appearance. She does not appear ill. No distress.   HENT:   Head: Normocephalic and atraumatic.   Ears:   Right Ear: Hearing, external ear and ear canal normal. Tympanic membrane is not injected and not bulging. A middle ear effusion is present.   Left Ear: Hearing, external ear and ear canal normal. Tympanic membrane is not injected and not bulging. A " middle ear effusion is present.   Nose: Mucosal edema, rhinorrhea and congestion present. No purulent discharge or nasal deformity. No epistaxis. Right sinus exhibits no maxillary sinus tenderness and no frontal sinus tenderness. Left sinus exhibits no maxillary sinus tenderness and no frontal sinus tenderness.   Mouth/Throat: Uvula is midline and mucous membranes are normal. No trismus in the jaw. Normal dentition. No uvula swelling. Posterior oropharyngeal erythema present. No oropharyngeal exudate or posterior oropharyngeal edema.   Eyes: Conjunctivae and lids are normal. Right eye exhibits chemosis. Right eye exhibits no discharge. Left eye exhibits chemosis. Left eye exhibits no discharge. Right conjunctiva is not injected. Left conjunctiva is not injected. No scleral icterus. periorbital hyperpigmentation   Neck: Trachea normal and phonation normal. Neck supple. No edema present. No erythema present. No neck rigidity present.   Cardiovascular: Normal rate, regular rhythm, normal heart sounds and normal pulses.   Pulmonary/Chest: Effort normal and breath sounds normal. No respiratory distress. She has no decreased breath sounds. She has no rhonchi.   Abdominal: Normal appearance.   Musculoskeletal: Normal range of motion.         General: No deformity. Normal range of motion.   Neurological: She is alert and oriented to person, place, and time. She exhibits normal muscle tone. Coordination normal.   Skin: Skin is warm, dry, intact, not diaphoretic and not pale.   Psychiatric: Her speech is normal and behavior is normal. Judgment and thought content normal.   Nursing note and vitals reviewed.      Assessment:     1. Environmental allergies    2. Sore throat        Plan:       Environmental allergies  -     promethazine-dextromethorphan (PROMETHAZINE-DM) 6.25-15 mg/5 mL Syrp; Take 5 mLs by mouth every 8 (eight) hours as needed.  Dispense: 180 mL; Refill: 0  -     dexAMETHasone (DECADRON) 4 MG Tab; Take 2 tablets  (8 mg total) by mouth once daily. for 3 doses  Dispense: 6 tablet; Refill: 0    Sore throat  -     POCT Strep A, Molecular          Medical Decision Making:   Initial Assessment:   Reviewed the patients recent allergy visits and including her testing which showed that she is allergic to dogs. Pt has 3. Pt has not been taking the antihistamine everyday.        Thank you for choosing [facility]!     Our goal in the Urgent Care is to always provide outstanding medical care. You may receive a survey by mail or e-mail in the next week regarding your experience today. We would greatly appreciate you completing and returning the survey. Your feedback provides us with a way to recognize our staff who provide very good care, and it helps us learn how to improve when your experience was below our aspiration of excellence.       We appreciate you trusting us with your medical care. We hope you feel better soon. We will be happy to take care of you for all of your future medical needs.  You must understand that you've received an Urgent Care treatment only and that you may be released before all your medical problems are known or treated. You, the patient, will arrange for follow up care as instructed.  Follow up with your PCP or specialty clinic as directed in the next 1-2 weeks if not improved or as needed.  You can call [phone] to schedule an appointment with the appropriate provider.  Another option is to follow up with [facility] ([email or web address]) virtually for quick simple medical advice.  If your condition worsens we recommend that you receive another evaluation at the emergency room immediately or contact your primary medical clinics after hours call service to discuss your concerns.  Please return here or go to the Emergency Department for any concerns or worsening of condition.      *If you were prescribed a narcotic or controlled medication, do  not drive or operate heavy equipment or machinery while taking these medications.

## 2024-10-03 NOTE — LETTER
October 3, 2024      [facility] [address]  [place] [address]  Phone: [phone]  Fax: [phone]       Patient: Migdalia Grijalva   YOB: 1980  Date of Visit: 10/03/2024    To Whom It May Concern:    Hemanth Grijalva  was at [facility] on 10/03/2024. The patient may return to work/school on 10/7/2024 with no restrictions. If you have any questions or concerns, or if I can be of further assistance, please do not hesitate to contact me.    Sincerely,    Leela Fields MA